# Patient Record
Sex: FEMALE | Race: WHITE
[De-identification: names, ages, dates, MRNs, and addresses within clinical notes are randomized per-mention and may not be internally consistent; named-entity substitution may affect disease eponyms.]

---

## 2017-03-06 ENCOUNTER — HOSPITAL ENCOUNTER (EMERGENCY)
Age: 82
Discharge: HOME | End: 2017-03-06
Payer: MEDICARE
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
## 2017-03-06 DIAGNOSIS — S01.81XA: Primary | ICD-10-CM

## 2017-03-06 DIAGNOSIS — R31.9: ICD-10-CM

## 2017-03-06 DIAGNOSIS — Z88.5: ICD-10-CM

## 2017-03-06 DIAGNOSIS — Z79.82: ICD-10-CM

## 2017-03-06 DIAGNOSIS — I48.91: ICD-10-CM
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
## 2017-03-06 DIAGNOSIS — Z79.899: ICD-10-CM

## 2017-03-06 LAB
BUN/CREATININE RATIO: 19 (ref 0–10)
HGB BLD-MCNC: 14.8 GM/DL (ref 12.3–15.3)
RED BLOOD COUNT: 4.82 M/UL (ref 4–5.1)
WHITE BLOOD COUNT: 11.7 K/UL (ref 4.5–11)

## 2017-06-19 ENCOUNTER — HOSPITAL ENCOUNTER (EMERGENCY)
Dept: HOSPITAL 79 - ER1 | Age: 82
Discharge: HOME | End: 2017-06-19
Payer: MEDICARE

## 2017-06-19 DIAGNOSIS — R07.9: Primary | ICD-10-CM

## 2017-06-19 DIAGNOSIS — I25.10: ICD-10-CM

## 2017-06-19 DIAGNOSIS — Z86.79: ICD-10-CM

## 2017-06-19 LAB
BUN/CREATININE RATIO: 14 (ref 0–10)
HGB BLD-MCNC: 13.7 GM/DL (ref 12.3–15.3)
RED BLOOD COUNT: 4.48 M/UL (ref 4–5.1)
WHITE BLOOD COUNT: 7.7 K/UL (ref 4.5–11)

## 2018-01-24 ENCOUNTER — APPOINTMENT (OUTPATIENT)
Dept: GENERAL RADIOLOGY | Facility: HOSPITAL | Age: 83
End: 2018-01-24

## 2018-01-24 ENCOUNTER — HOSPITAL ENCOUNTER (INPATIENT)
Facility: HOSPITAL | Age: 83
LOS: 2 days | Discharge: HOME HEALTH | End: 2018-01-26
Attending: FAMILY MEDICINE | Admitting: FAMILY MEDICINE

## 2018-01-24 ENCOUNTER — ANESTHESIA (OUTPATIENT)
Dept: PERIOP | Facility: HOSPITAL | Age: 83
End: 2018-01-24

## 2018-01-24 ENCOUNTER — ANESTHESIA EVENT (OUTPATIENT)
Dept: PERIOP | Facility: HOSPITAL | Age: 83
End: 2018-01-24

## 2018-01-24 DIAGNOSIS — Z74.09 IMPAIRED FUNCTIONAL MOBILITY, BALANCE, GAIT, AND ENDURANCE: ICD-10-CM

## 2018-01-24 DIAGNOSIS — I50.9 ACUTE ON CHRONIC CONGESTIVE HEART FAILURE, UNSPECIFIED CONGESTIVE HEART FAILURE TYPE: ICD-10-CM

## 2018-01-24 DIAGNOSIS — A41.9 SEPSIS SECONDARY TO UTI (HCC): ICD-10-CM

## 2018-01-24 DIAGNOSIS — J96.01 ACUTE RESPIRATORY FAILURE WITH HYPOXIA (HCC): ICD-10-CM

## 2018-01-24 DIAGNOSIS — N39.0 SEPSIS SECONDARY TO UTI (HCC): ICD-10-CM

## 2018-01-24 DIAGNOSIS — N20.1 URETERAL CALCULUS, RIGHT: Primary | ICD-10-CM

## 2018-01-24 PROBLEM — I48.20 CHRONIC ATRIAL FIBRILLATION (HCC): Chronic | Status: ACTIVE | Noted: 2018-01-24

## 2018-01-24 PROBLEM — I50.42 CHRONIC COMBINED SYSTOLIC AND DIASTOLIC HEART FAILURE: Chronic | Status: ACTIVE | Noted: 2018-01-24

## 2018-01-24 PROBLEM — N32.81 OVERACTIVE BLADDER: Chronic | Status: ACTIVE | Noted: 2018-01-24

## 2018-01-24 PROBLEM — E78.5 DYSLIPIDEMIA: Chronic | Status: ACTIVE | Noted: 2018-01-24

## 2018-01-24 PROBLEM — I25.10 ATHEROSCLEROSIS OF NATIVE CORONARY ARTERY OF NATIVE HEART WITHOUT ANGINA PECTORIS: Chronic | Status: ACTIVE | Noted: 2018-01-24

## 2018-01-24 PROBLEM — D68.318 COAGULATION DISORDER DUE TO CIRCULATING ANTICOAGULANTS (HCC): Chronic | Status: ACTIVE | Noted: 2018-01-24

## 2018-01-24 PROBLEM — I50.32 CHRONIC DIASTOLIC HEART FAILURE: Chronic | Status: ACTIVE | Noted: 2018-01-24

## 2018-01-24 PROBLEM — J90 PLEURAL EFFUSION ON RIGHT: Status: ACTIVE | Noted: 2018-01-24

## 2018-01-24 LAB
ALBUMIN SERPL-MCNC: 3.7 G/DL (ref 3.5–5)
ALBUMIN/GLOB SERPL: 1 G/DL (ref 1–2)
ALP SERPL-CCNC: 74 U/L (ref 38–126)
ALT SERPL W P-5'-P-CCNC: 28 U/L (ref 13–69)
ANION GAP SERPL CALCULATED.3IONS-SCNC: 14 MMOL/L
AST SERPL-CCNC: 19 U/L (ref 15–46)
BILIRUB SERPL-MCNC: 0.9 MG/DL (ref 0.2–1.3)
BUN BLD-MCNC: 17 MG/DL (ref 7–20)
BUN/CREAT SERPL: 14.2 (ref 7.1–23.5)
CALCIUM SPEC-SCNC: 10.2 MG/DL (ref 8.4–10.2)
CHLORIDE SERPL-SCNC: 104 MMOL/L (ref 98–107)
CO2 SERPL-SCNC: 29 MMOL/L (ref 26–30)
CREAT BLD-MCNC: 1.2 MG/DL (ref 0.6–1.3)
D-LACTATE SERPL-SCNC: 1.2 MMOL/L (ref 0.5–2)
GFR SERPL CREATININE-BSD FRML MDRD: 43 ML/MIN/1.73
GLOBULIN UR ELPH-MCNC: 3.6 GM/DL
GLUCOSE BLD-MCNC: 119 MG/DL (ref 74–98)
MAGNESIUM SERPL-MCNC: 2.1 MG/DL (ref 1.6–2.3)
POTASSIUM BLD-SCNC: 4 MMOL/L (ref 3.5–5.1)
PROT SERPL-MCNC: 7.3 G/DL (ref 6.3–8.2)
SODIUM BLD-SCNC: 143 MMOL/L (ref 137–145)

## 2018-01-24 PROCEDURE — 52356 CYSTO/URETERO W/LITHOTRIPSY: CPT | Performed by: UROLOGY

## 2018-01-24 PROCEDURE — 25010000002 ONDANSETRON PER 1 MG: Performed by: FAMILY MEDICINE

## 2018-01-24 PROCEDURE — 83605 ASSAY OF LACTIC ACID: CPT | Performed by: FAMILY MEDICINE

## 2018-01-24 PROCEDURE — 87040 BLOOD CULTURE FOR BACTERIA: CPT | Performed by: FAMILY MEDICINE

## 2018-01-24 PROCEDURE — 25010000002 MORPHINE PER 10 MG: Performed by: FAMILY MEDICINE

## 2018-01-24 PROCEDURE — 99223 1ST HOSP IP/OBS HIGH 75: CPT | Performed by: UROLOGY

## 2018-01-24 PROCEDURE — 0TC68ZZ EXTIRPATION OF MATTER FROM RIGHT URETER, VIA NATURAL OR ARTIFICIAL OPENING ENDOSCOPIC: ICD-10-PCS | Performed by: UROLOGY

## 2018-01-24 PROCEDURE — C1726 CATH, BAL DIL, NON-VASCULAR: HCPCS | Performed by: UROLOGY

## 2018-01-24 PROCEDURE — 25010000002 CEFTRIAXONE IN SWFI 1 GRAM/10ML IV PUSH SYRINGE (SIMPLE): Performed by: FAMILY MEDICINE

## 2018-01-24 PROCEDURE — 82360 CALCULUS ASSAY QUANT: CPT | Performed by: UROLOGY

## 2018-01-24 PROCEDURE — C1758 CATHETER, URETERAL: HCPCS | Performed by: UROLOGY

## 2018-01-24 PROCEDURE — 99223 1ST HOSP IP/OBS HIGH 75: CPT | Performed by: FAMILY MEDICINE

## 2018-01-24 PROCEDURE — C1769 GUIDE WIRE: HCPCS | Performed by: UROLOGY

## 2018-01-24 PROCEDURE — 82330 ASSAY OF CALCIUM: CPT | Performed by: FAMILY MEDICINE

## 2018-01-24 PROCEDURE — 80053 COMPREHEN METABOLIC PANEL: CPT | Performed by: FAMILY MEDICINE

## 2018-01-24 PROCEDURE — 74018 RADEX ABDOMEN 1 VIEW: CPT

## 2018-01-24 PROCEDURE — C2617 STENT, NON-COR, TEM W/O DEL: HCPCS | Performed by: UROLOGY

## 2018-01-24 PROCEDURE — 25010000002 PROPOFOL 200 MG/20ML EMULSION: Performed by: NURSE ANESTHETIST, CERTIFIED REGISTERED

## 2018-01-24 PROCEDURE — 87086 URINE CULTURE/COLONY COUNT: CPT | Performed by: FAMILY MEDICINE

## 2018-01-24 PROCEDURE — 83735 ASSAY OF MAGNESIUM: CPT | Performed by: FAMILY MEDICINE

## 2018-01-24 PROCEDURE — 71045 X-RAY EXAM CHEST 1 VIEW: CPT

## 2018-01-24 PROCEDURE — 25010000002 FENTANYL CITRATE (PF) 100 MCG/2ML SOLUTION: Performed by: NURSE ANESTHETIST, CERTIFIED REGISTERED

## 2018-01-24 PROCEDURE — 0T768DZ DILATION OF RIGHT URETER WITH INTRALUMINAL DEVICE, VIA NATURAL OR ARTIFICIAL OPENING ENDOSCOPIC: ICD-10-PCS | Performed by: UROLOGY

## 2018-01-24 DEVICE — URETERAL STENT
Type: IMPLANTABLE DEVICE | Site: URETER | Status: FUNCTIONAL
Brand: CONTOUR™

## 2018-01-24 RX ORDER — RANOLAZINE 500 MG/1
500 TABLET, EXTENDED RELEASE ORAL 2 TIMES DAILY
COMMUNITY
End: 2020-08-17 | Stop reason: HOSPADM

## 2018-01-24 RX ORDER — SODIUM CHLORIDE 9 MG/ML
INJECTION, SOLUTION INTRAVENOUS CONTINUOUS PRN
Status: DISCONTINUED | OUTPATIENT
Start: 2018-01-24 | End: 2018-01-24 | Stop reason: SURG

## 2018-01-24 RX ORDER — MORPHINE SULFATE 2 MG/ML
1 INJECTION, SOLUTION INTRAMUSCULAR; INTRAVENOUS
Status: DISCONTINUED | OUTPATIENT
Start: 2018-01-24 | End: 2018-01-26 | Stop reason: HOSPADM

## 2018-01-24 RX ORDER — METOPROLOL TARTRATE 5 MG/5ML
2.5 INJECTION INTRAVENOUS EVERY 6 HOURS PRN
Status: DISCONTINUED | OUTPATIENT
Start: 2018-01-24 | End: 2018-01-26 | Stop reason: HOSPADM

## 2018-01-24 RX ORDER — NALOXONE HCL 0.4 MG/ML
0.4 VIAL (ML) INJECTION
Status: DISCONTINUED | OUTPATIENT
Start: 2018-01-24 | End: 2018-01-26 | Stop reason: HOSPADM

## 2018-01-24 RX ORDER — METOPROLOL TARTRATE 50 MG/1
100 TABLET, FILM COATED ORAL 2 TIMES DAILY
COMMUNITY

## 2018-01-24 RX ORDER — ASPIRIN 81 MG/1
81 TABLET ORAL DAILY
Status: DISCONTINUED | OUTPATIENT
Start: 2018-01-24 | End: 2018-01-26 | Stop reason: HOSPADM

## 2018-01-24 RX ORDER — ONDANSETRON 2 MG/ML
4 INJECTION INTRAMUSCULAR; INTRAVENOUS EVERY 6 HOURS PRN
Status: DISCONTINUED | OUTPATIENT
Start: 2018-01-24 | End: 2018-01-26 | Stop reason: HOSPADM

## 2018-01-24 RX ORDER — PRAVASTATIN SODIUM 20 MG
20 TABLET ORAL NIGHTLY
COMMUNITY

## 2018-01-24 RX ORDER — FUROSEMIDE 40 MG/1
40 TABLET ORAL EVERY OTHER DAY
Status: ON HOLD | COMMUNITY
End: 2020-08-13

## 2018-01-24 RX ORDER — PROPOFOL 10 MG/ML
INJECTION, EMULSION INTRAVENOUS AS NEEDED
Status: DISCONTINUED | OUTPATIENT
Start: 2018-01-24 | End: 2018-01-24 | Stop reason: SURG

## 2018-01-24 RX ORDER — RANOLAZINE 500 MG/1
500 TABLET, EXTENDED RELEASE ORAL EVERY 12 HOURS SCHEDULED
Status: DISCONTINUED | OUTPATIENT
Start: 2018-01-24 | End: 2018-01-26 | Stop reason: HOSPADM

## 2018-01-24 RX ORDER — SODIUM CHLORIDE 9 MG/ML
50 INJECTION, SOLUTION INTRAVENOUS CONTINUOUS
Status: DISCONTINUED | OUTPATIENT
Start: 2018-01-24 | End: 2018-01-25

## 2018-01-24 RX ORDER — DABIGATRAN ETEXILATE 75 MG/1
75 CAPSULE ORAL EVERY 12 HOURS SCHEDULED
Status: DISCONTINUED | OUTPATIENT
Start: 2018-01-24 | End: 2018-01-24 | Stop reason: ALTCHOICE

## 2018-01-24 RX ORDER — ISOSORBIDE MONONITRATE 30 MG/1
30 TABLET, EXTENDED RELEASE ORAL
Status: DISCONTINUED | OUTPATIENT
Start: 2018-01-24 | End: 2018-01-26 | Stop reason: HOSPADM

## 2018-01-24 RX ORDER — DOCUSATE SODIUM 100 MG/1
100 CAPSULE, LIQUID FILLED ORAL 2 TIMES DAILY PRN
Status: ON HOLD | COMMUNITY
End: 2020-08-13

## 2018-01-24 RX ORDER — DOCUSATE SODIUM 100 MG/1
100 CAPSULE, LIQUID FILLED ORAL 2 TIMES DAILY PRN
Status: DISCONTINUED | OUTPATIENT
Start: 2018-01-24 | End: 2018-01-26 | Stop reason: HOSPADM

## 2018-01-24 RX ORDER — ATORVASTATIN CALCIUM 10 MG/1
10 TABLET, FILM COATED ORAL DAILY
Status: DISCONTINUED | OUTPATIENT
Start: 2018-01-24 | End: 2018-01-26 | Stop reason: HOSPADM

## 2018-01-24 RX ORDER — MAGNESIUM HYDROXIDE 1200 MG/15ML
LIQUID ORAL AS NEEDED
Status: DISCONTINUED | OUTPATIENT
Start: 2018-01-24 | End: 2018-01-24 | Stop reason: HOSPADM

## 2018-01-24 RX ORDER — FUROSEMIDE 20 MG/1
40 TABLET ORAL EVERY MORNING
COMMUNITY
End: 2020-08-17 | Stop reason: HOSPADM

## 2018-01-24 RX ORDER — LEVOFLOXACIN 5 MG/ML
500 INJECTION, SOLUTION INTRAVENOUS
Status: DISCONTINUED | OUTPATIENT
Start: 2018-01-24 | End: 2018-01-24 | Stop reason: HOSPADM

## 2018-01-24 RX ORDER — NALOXONE HCL 0.4 MG/ML
0.4 VIAL (ML) INJECTION
Status: DISCONTINUED | OUTPATIENT
Start: 2018-01-24 | End: 2018-01-24

## 2018-01-24 RX ORDER — MULTIPLE VITAMINS W/ MINERALS TAB 9MG-400MCG
1 TAB ORAL DAILY
Status: DISCONTINUED | OUTPATIENT
Start: 2018-01-24 | End: 2018-01-26 | Stop reason: HOSPADM

## 2018-01-24 RX ORDER — SODIUM CHLORIDE 0.9 % (FLUSH) 0.9 %
1-10 SYRINGE (ML) INJECTION AS NEEDED
Status: DISCONTINUED | OUTPATIENT
Start: 2018-01-24 | End: 2018-01-26 | Stop reason: HOSPADM

## 2018-01-24 RX ORDER — TOLTERODINE 4 MG/1
4 CAPSULE, EXTENDED RELEASE ORAL DAILY
COMMUNITY

## 2018-01-24 RX ORDER — FENTANYL CITRATE 50 UG/ML
INJECTION, SOLUTION INTRAMUSCULAR; INTRAVENOUS AS NEEDED
Status: DISCONTINUED | OUTPATIENT
Start: 2018-01-24 | End: 2018-01-24 | Stop reason: SURG

## 2018-01-24 RX ORDER — MORPHINE SULFATE 4 MG/ML
1 INJECTION, SOLUTION INTRAMUSCULAR; INTRAVENOUS
Status: DISCONTINUED | OUTPATIENT
Start: 2018-01-24 | End: 2018-01-24

## 2018-01-24 RX ORDER — DABIGATRAN ETEXILATE 75 MG/1
75 CAPSULE ORAL EVERY 12 HOURS SCHEDULED
Status: DISCONTINUED | OUTPATIENT
Start: 2018-01-25 | End: 2018-01-26 | Stop reason: HOSPADM

## 2018-01-24 RX ORDER — DABIGATRAN ETEXILATE 75 MG/1
75 CAPSULE ORAL 2 TIMES DAILY
COMMUNITY

## 2018-01-24 RX ORDER — POTASSIUM CHLORIDE 750 MG/1
10 TABLET, FILM COATED, EXTENDED RELEASE ORAL DAILY
Status: ON HOLD | COMMUNITY
End: 2020-08-13

## 2018-01-24 RX ORDER — ASPIRIN 81 MG/1
81 TABLET ORAL DAILY
COMMUNITY

## 2018-01-24 RX ORDER — ISOSORBIDE MONONITRATE 30 MG/1
30 TABLET, EXTENDED RELEASE ORAL 2 TIMES DAILY
COMMUNITY

## 2018-01-24 RX ADMIN — Medication 100 MCG: at 14:11

## 2018-01-24 RX ADMIN — LIDOCAINE HYDROCHLORIDE 80 MG: 20 INJECTION, SOLUTION INTRAVENOUS at 13:59

## 2018-01-24 RX ADMIN — MORPHINE SULFATE 1 MG: 2 INJECTION, SOLUTION INTRAMUSCULAR; INTRAVENOUS at 12:45

## 2018-01-24 RX ADMIN — FENTANYL CITRATE 25 MCG: 50 INJECTION, SOLUTION INTRAMUSCULAR; INTRAVENOUS at 13:59

## 2018-01-24 RX ADMIN — RANOLAZINE 500 MG: 500 TABLET, FILM COATED, EXTENDED RELEASE ORAL at 08:35

## 2018-01-24 RX ADMIN — Medication 100 MCG: at 14:18

## 2018-01-24 RX ADMIN — Medication 100 MCG: at 14:25

## 2018-01-24 RX ADMIN — MULTIPLE VITAMINS W/ MINERALS TAB 1 TABLET: TAB at 08:35

## 2018-01-24 RX ADMIN — CEFTRIAXONE SODIUM 1 G: 1 INJECTION, POWDER, FOR SOLUTION INTRAMUSCULAR; INTRAVENOUS at 18:44

## 2018-01-24 RX ADMIN — Medication 100 MCG: at 14:31

## 2018-01-24 RX ADMIN — SODIUM CHLORIDE: 9 INJECTION, SOLUTION INTRAVENOUS at 13:57

## 2018-01-24 RX ADMIN — PROPOFOL 100 MG: 10 INJECTION, EMULSION INTRAVENOUS at 13:59

## 2018-01-24 RX ADMIN — EPHEDRINE SULFATE 10 MG: 50 INJECTION INTRAMUSCULAR; INTRAVENOUS; SUBCUTANEOUS at 14:05

## 2018-01-24 RX ADMIN — Medication 100 MCG: at 14:05

## 2018-01-24 RX ADMIN — METOPROLOL TARTRATE 75 MG: 50 TABLET ORAL at 20:51

## 2018-01-24 RX ADMIN — METOPROLOL TARTRATE 75 MG: 50 TABLET ORAL at 08:35

## 2018-01-24 RX ADMIN — RANOLAZINE 500 MG: 500 TABLET, FILM COATED, EXTENDED RELEASE ORAL at 20:52

## 2018-01-24 RX ADMIN — ATORVASTATIN CALCIUM 10 MG: 10 TABLET, FILM COATED ORAL at 08:35

## 2018-01-24 RX ADMIN — ISOSORBIDE MONONITRATE 30 MG: 30 TABLET, EXTENDED RELEASE ORAL at 08:35

## 2018-01-24 RX ADMIN — SODIUM CHLORIDE 100 ML/HR: 9 INJECTION, SOLUTION INTRAVENOUS at 15:35

## 2018-01-24 RX ADMIN — ONDANSETRON 4 MG: 2 INJECTION INTRAMUSCULAR; INTRAVENOUS at 12:46

## 2018-01-24 RX ADMIN — SODIUM CHLORIDE 100 ML/HR: 9 INJECTION, SOLUTION INTRAVENOUS at 02:18

## 2018-01-24 NOTE — OP NOTE
URETEROSCOPY LASER LITHOTRIPSY WITH STENT INSERTION  Procedure Note    Matthew Fishman  1/24/2018    Pre-op Diagnosis:   Ureteral calculus, right [N20.1]  Sepsis secondary to UTI [A41.9, N39.0]    Post-op Diagnosis:     Post-Op Diagnosis Codes:     * Ureteral calculus, right [N20.1]     * Sepsis secondary to UTI [A41.9, N39.0]    Procedure/CPT® Codes:      Procedure(s):  URETEROSCOPY LASER LITHOTRIPSY WITH STENT INSERTION    Surgeon(s):  Camron Taveras MD    Anesthesia: General  Operative technique: This patient has a 7 mm calculus in her right pelvic ureter.  She was given general anesthesia placed in the dorsal lithotomy position prepped and draped in routine sterile fashion.  The cystoscope was inserted into the bladder but no urine was obtained since she had a catheter in.  The right ureteral orifice was immediately seen in the universal wedge was used to insert contrast and saline to outline the right collecting system.  High-grade obstruction was seen from a 7 mm stone with proximal dilatation.  The distal ureter was dilated to 4 mm with the balloon and then rigid ureteroscopy was performed alongside the guidewire that was inserted up into the right renal pelvis.  The calculus was immediately apparent and engaged with basket but was too large to extract.  The 365 µ laser fiber was connected to the 20 W holmium laser and used to fragment the stone into pieces.  These were individually extracted and submitted for chemical analysis.  A 6 [place] 22 cm double-J stent was then inserted up into the right kidney.  The bladder was drained with a Jimenez was not reinserted.  The patient tolerated the procedure well and was returned to the ICU in stable condition.  Staff:   Circulator: Sydni Arellano RN; Dione Carlson RN  Scrub Person: Kiara Guerra    Estimated Blood Loss: none    Specimens:                  ID Type Source Tests Collected by Time Destination   1 : KIDNEY STONE RIGHT Calculus Kidney, Right STONE  ANALYSIS Dione Carlson RN 1/24/2018 1430          Drains:           Findings: 7 mm calculus impacted in the right distal ureter    Complications: None      Camron Taveras MD     Date: 1/24/2018  Time: 3:07 PM

## 2018-01-24 NOTE — PROGRESS NOTES
Adult Nutrition  Assessment/PES    Patient Name:  Matthew Fishman  YOB: 1935  MRN: [id number]  Admit Date:  1/24/2018    Assessment Date:  1/24/2018    Comments:  Rec. #1: Advance diet once medically feasible to cardiac. RD to add nutritional supplements once diet advances. Rec. #2: Continue with MVI. RD to follow pt. Consult RD PRN.           Reason for Assessment       01/24/18 1144    Reason for Assessment    Reason For Assessment/Visit admission assessment    Identified At Risk By Screening Criteria BMI;diagnosis    Cardiac DHF;HTN;CABG;MI;Dyslipidemia    Gastrointestinal Nausea    Infectious Disease Sepsis    Pulmonary/Critical Care Acute respiratory failure;Other (comment)   hypoxia; pleural effusion    Renal Other (comment)   ureterolitiasis                  Labs/Tests/Procedures/Meds       01/24/18 1146    Labs/Tests/Procedures/Meds    Labs/Tests Review Reviewed;Glucose   High    Medication Review Reviewed, pertinent;Antibiotic;Multivitamin            Physical Findings       01/24/18 1147    Physical Findings/Assessment    Additional Documentation Physical Appearance (Group)    Physical Appearance    Overall Physical Appearance obese            Estimated/Assessed Needs       01/24/18 1147    Calculation Measurements    Weight Used For Calculations 53.2 kg (117 lb 4.6 oz)    Height Used for Calculations 1.524 m (5')    Estimated/Assessed Energy Needs    Energy Need Method Pender-St Jeor    Age 82    RMR (Pender-St. Jeor Equation) 918.5    Activity Factors (Pender St Jeor)  Out of bed, ambulatory  1.3    Estimated Kcal Range  ~2887-6546    Estimated/Assessed Protein Needs    Weight Used for Protein Calculation 53.2 kg (117 lb 4.6 oz)    Protein (gm/kg) 1.5    1.5 Gm Protein (gm) 79.8    Estimated Protein Range ~63.84-79.8    Estimated/Assessed Fluid Needs    Fluid Need Method RDA method    RDA Method (mL)  1500            Nutrition Prescription Ordered       01/24/18 1148    Nutrition  Prescription PO    Current PO Diet NPO   x 1 day            Evaluation of Received Nutrient/Fluid Intake       01/24/18 1148    PO Evaluation    Number of Days PO Intake Evaluated Insufficient Data   NPO            Problem/Interventions:        Problem 1       01/24/18 1148    Nutrition Diagnoses Problem 1    Problem 1 Overweight/Obesity    Etiology (related to) Factors Affecting Nutrition    Food Habit/Preferences Large Meals    Signs/Symptoms (evidenced by) BMI    BMI 30 - 34.9            Problem 2       01/24/18 1149    Nutrition Diagnoses Problem 2    Problem 2 Increased Nutrient Needs    Macronutrient Kcal;Protein    Etiology (related to) Medical Diagnosis    Infectious Disease Sepsis    Signs/Symptoms (evidenced by) Other (comment)   sepsis dx.                   Intervention Goal       01/24/18 1150    Intervention Goal    General Meet nutritional needs for age/condition    PO Advance diet            Nutrition Intervention       01/24/18 1151    Nutrition Intervention    RD/Tech Action Await begin PO;Follow Tx progress            Nutrition Prescription       01/24/18 1151    Nutrition Prescription PO    PO Prescription Begin/change diet;Begin/change supplement    Begin/Change Diet to Clear Liquid    Supplement Ensure Clear    Supplement Frequency 3 times a day    New PO Prescription Ordered? No, recommended            Education/Evaluation       01/24/18 1151    Education    Education Will Instruct as appropriate    Monitor/Evaluation    Monitor Per protocol;PO intake;Supplement intake;Pertinent labs;Weight;I&O        Electronically signed by:  Karina Estrada RD  01/24/18 11:51 AM

## 2018-01-24 NOTE — ANESTHESIA POSTPROCEDURE EVALUATION
Patient: Matthew Fishman    Procedure Summary     Date Anesthesia Start Anesthesia Stop Room / Location    01/24/18 1357  [facility]       Procedure Diagnosis Surgeon Provider    URETEROSCOPY LASER LITHOTRIPSY WITH STENT INSERTION (N/A ) Ureteral calculus, right; Sepsis secondary to UTI  (Ureteral calculus, right [N20.1]; Sepsis secondary to UTI [A41.9, N39.0]) MD Galen Parish CRNA          Anesthesia Type: general  Last vitals  BP   134/65   Temp   97   Pulse   101   Resp   18   SpO2   99     Post Anesthesia Care and Evaluation    Patient location during evaluation: ICU  Patient participation: complete - patient participated  Level of consciousness: awake and alert  Pain score: 0  Pain management: satisfactory to patient  Airway patency: patent  Anesthetic complications: No anesthetic complications  PONV Status: none  Cardiovascular status: acceptable and stable  Respiratory status: face mask and acceptable  Hydration status: acceptable

## 2018-01-24 NOTE — PLAN OF CARE
Problem: Patient Care Overview (Adult)  Goal: Plan of Care Review  Outcome: Ongoing (interventions implemented as appropriate)   01/24/18 0353   Coping/Psychosocial Response Interventions   Plan Of Care Reviewed With patient   Patient Care Overview   Progress no change       Problem: Infection, Risk/Actual (Adult)  Goal: Identify Related Risk Factors and Signs and Symptoms  Outcome: Ongoing (interventions implemented as appropriate)    Goal: Infection Prevention/Resolution  Outcome: Ongoing (interventions implemented as appropriate)

## 2018-01-24 NOTE — H&P
"    [facility] Medicine Services  HISTORY AND PHYSICAL    Primary Care Physician: Mandy Rhodes MD    Subjective     Chief Complaint:  Sepsis with UTI/A fib with RVR/R ureterolithiasis      History of Present Illness:     I have reviewed labs/imaging/records from this hospitalization, including ER staff and admitting/attending physicians H/P's and progress notes to establish a comprehensive understanding of this patient's clinical hospital course, as well as to establish a transition of care appropriately.    Patient is an 82-year-old female who presented to [facility] ER with complaints of 1-2 days of generalized abdominal and right lower back discomfort.  She developed some significant nausea and pain described as \"giving birth\".  Patient gave history of nonspecific subjective fever with chills.  In the emergency room at that facility showed underwent a CT scan of the abdomen and pelvis which demonstrated a 7 mm obstructing ureterolithiasis, with moderate hydroureteronephrosis.  Patient does take Lasix daily as treatment for her combined systolic/diastolic heart failure.  She has known extensive coronary atherosclerotic disease, including history of CABG and stenting post CABG.  Patient does have an AICD.  She is normally followed by her cardiologist in [facility], although she denies any active chest pain or uncontrolled respiratory symptoms.  She was noted to have hypoxia by EMS as well as in the emergency room, subsequently improved with 2 L nasal cannula support.  Dr. Didier Taveras was contacted by [facility] ER staff, and he agreed to see patient if she was admitted to this facility.  We had an ICU bed available, and patient was subsequently transferred here she was clinically and sepsis, complicated by atrial fibrillation with RVR shortly after arrival to that facility.  She does have chronic atrial fibrillation, and his anticoagulated with Pradaxa.  Her lactic acid level " was normal, however her heart rate was elevated.  She was started on IV Rocephin due to a recent urinary tract infection demonstrating Escherichia coli that was sensitive to Rocephin approximately 12 months ago.  Her creatinine was 1.1 and BUN was reported at 14.  Her white blood cell count was elevated to 18,000.  Arterial blood gas showed a pH of 7.43/PCO2 of 49/CO2 of 81.    Patient is accompanied to the intensive care unit by her daughter who is at bedside.  [facility]    Review of Systems   1. Constitutional: Subjective fever with chills.  No diaphoresis, but progressive worsening of malaise/fatigue.  2. HENT: No dysphagia; no changes to vision/hearing/smell/taste; no epistaxis  3. Eyes: Negative for redness and visual disturbance.   4. Respiratory: negative for shortness of breath. Negative for chest pain . Negative for cough and chest tightness.   5. Cardiovascular: Negative for chest pain and palpitations.   6. Gastrointestinal: Negative for abdominal distention, abdominal pain and blood in stool.  Decreased appetite with resultant poor by mouth intake.  7. Endocrine: Negative for cold intolerance and heat intolerance.   8. Genitourinary: Decreased frequency and volume of urination, no defined hematuria.   9. Musculoskeletal: Chronic arthralgias, with new onset right lower back pain and myalgia.   10. Skin: Negative for color change, rash and wound.   11. Neurological: Negative for syncope, weakness and headaches.   12. Hematological: Negative for adenopathy. Does not bruise/bleed easily.   13. Psychiatric/Behavioral: Negative for confusion. The patient is not nervous/anxious.     Past Medical History:   Past Medical History:   Diagnosis Date   • Arthritis    • Atrial fibrillation, chronic    • High cholesterol    • History of MI (myocardial infarction)    • HTN (hypertension)    • Hx of CABG 1989    triple   • Hx of cholecystectomy    • Pacemaker    •  Previous back surgery        Past Surgical History:  Past Surgical History:   Procedure Laterality Date   • BACK SURGERY     • CHOLECYSTECTOMY     • CORONARY ARTERY BYPASS GRAFT      x3       Family History: family history is not on file.    Social History:  reports that she has never smoked. She has never used smokeless tobacco. She reports that she does not drink alcohol or use illicit drugs.    Medications:  Prescriptions Prior to Admission   Medication Sig Dispense Refill Last Dose   • aspirin 81 MG EC tablet Take 81 mg by mouth Daily.   1/23/2018 at Unknown time   • dabigatran etexilate (PRADAXA) 75 MG capsule Take 75 mg by mouth 2 (Two) Times a Day.   1/23/2018 at Unknown time   • docusate sodium (COLACE) 100 MG capsule Take 100 mg by mouth 2 (Two) Times a Day As Needed for Constipation.      • furosemide (LASIX) 20 MG tablet Take 20 mg by mouth Every Other Day.   1/23/2018 at Unknown time   • furosemide (LASIX) 40 MG tablet Take 40 mg by mouth Every Other Day.   1/23/2018 at Unknown time   • isosorbide mononitrate (IMDUR) 30 MG 24 hr tablet Take 30 mg by mouth 2 (Two) Times a Day.   1/23/2018 at Unknown time   • metoprolol tartrate (LOPRESSOR) 50 MG tablet Take 75 mg by mouth 2 (Two) Times a Day.   1/23/2018 at Unknown time   • Multiple Vitamins-Minerals (CENTRUM SILVER 50+WOMEN PO) Take 1 tablet by mouth Daily.   1/23/2018 at Unknown time   • potassium chloride (K-DUR) 10 MEQ CR tablet Take 10 mEq by mouth Daily.   1/23/2018 at Unknown time   • pravastatin (PRAVACHOL) 20 MG tablet Take 20 mg by mouth Daily.   1/23/2018 at Unknown time   • ranolazine (RANEXA) 500 MG 12 hr tablet Take 500 mg by mouth 2 (Two) Times a Day.   1/23/2018 at Unknown time   • tolterodine LA (DETROL LA) 4 MG 24 hr capsule Take 4 mg by mouth Daily.   1/23/2018 at Unknown time       Allergies:  Allergies   Allergen Reactions   • Codeine          Objective     Physical Exam:  Vital Signs: There were no vitals taken for this visit.      General Appearance: alert, oriented x 3, no acute distress.Mildly ill-appearing female, pleasant during examination and questioning.  Skin: warm and dry.  Mildly decreased skin turgor.  Age-related atrophy of the skin.  HEENT: Atraumatic.  pupils round and reactive to light and accommodation, oral mucosa pink and moist.  Nares patent without epistaxis.  External auditory canals are patent tympanic membranes intact.  Neck: supple, no JVD, trachea midline.  No thyromegaly  Lungs: Rhonchus breath sounds to right lower lung base, fine sandpaper-like crackles to right base as well.  Audible air exchange noted all other lung fields  Heart: Heart rate ranges between  bpm during my exam, normal S1 and S2, no S3, no rub.  AICD noted subclavicular  Abdomen: soft, non-tender, no palpable bladder, present bowel sounds to auscultation ×4.  No guarding or rigidity.  No defined CVA tenderness.  Extremities: no clubbing, cyanosis or edema.  Good range of motion actively and passively.  Symmetric muscle strength and development.  Thin, frail build.  Neuro: normal speech and mental status.  Cranial nerves II through XII intact.  No anosmia. DTR 2+; proprioception intact.  No focal motor/sensory deficits.          Results Reviewed:    Lab Results (last 72 hours)     ** No results found for the last 72 hours. **          Imaging Results (last 72 hours)     ** No results found for the last 72 hours. **          ECG/EMG Results (most recent)     None          I have personally reviewed and interpreted available lab data, radiology studies and ECG obtained at time of admission.     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  Patient's heart rate appears relatively well controlled at present, we'll begin her normal scheduled dosing of metoprolol and continue monitoring.  I reviewed the images provided on CT scan of the abdomen and pelvis on disc from [facility] ER.  Noted right distal ureteral stone.  There is a significant right lower lobe pleural effusion versus early consolidation.  I will repeat chest x-ray now.  I have ordered a repeat CMP/CBC/magnesium now, as well as repeated blood culture with ARDS.  I will repeat a urine culture.  Jimenez catheter placed secondary to immobility.  Dr. Didier Taveras has been consult it, hopeful to see patient later this morning, we'll keep patient nothing by mouth until such time.  Patient will need ureteroscopy with probable stent placement and possibly lithotripsy.  Continue IV Rocephin at this time.  Follow cultures and change antibiotic coverage as needed.  IV fluids with normal saline rehydration, avoid bolus therapy given her underlying combined systolic/diastolic heart failure.  Home medications have been reconciled.  We'll hold use of Lasix at this time due to her ureterolithiasis.  Anti-emetic and analgesic ordered as needed.  Continue oxygen supplementation via nasal cannula as needed.  Encouraged use of incentive spirometry 6 times per hour while awake.  I've discussed the plan of care in detail with patient and her daughter at bedside, both verbalize understanding and agree.  I've answered all the questions today.    I spent a total of 70 minutes in direct critical care time today.      Ajit Weller,  01/24/18 2:00 AM    Please note that portions of this note may have been completed with a voice recognition program. Efforts were made to edit the dictations, but occasionally  words are mistranscribed.

## 2018-01-24 NOTE — PROGRESS NOTES
[facility]    Name:  Matthew Fishman   Age:  82 y.o.  Sex:  female  :  1935  MRN:  [id number]   Visit Number:  [id number]  Admission Date:  2018  Date Of Service:  18  Primary Care Physician:  Mandy Rhodes MD     LOS: 0 days :      Chief Complaint:  Abdominal pain. Follow up UTI sepsis and ureterolithiasis        Subjective / Interval History: The patient is a 82 yr old lady with afib on anticoagulation, admitted with afib with rvr, UTI, sepsis, and ureterolithiasis. She is sitting up in ICU bed in no acute distress. She has mild pain without grimace. Jimenez in place. She denies chest pain, shortness of breath, nausea, vomiting. She is NPO for procedure today.       Review of Systems:     General ROS: Patient denies any fevers, chills or loss of consciousness.  Ophthalmic ROS: Denies any diplopia or transient loss of vision.  ENT ROS: Denies sore throat, nasal congestion or ear pain.   Respiratory ROS: Small cough or shortness of breath.  Cardiovascular ROS: Denies chest pain or palpitations. No history of exertional chest pain.   Gastrointestinal ROS: Denies nausea and vomiting. Mild right sided aching and burning abdominal pain. No diarrhea.  Genito-Urinary ROS: Denies dysuria or hematuria.  Musculoskeletal ROS: Denies back pain. No muscle pain. No calf pain.  Neurological ROS: Denies any focal weakness. No loss of consciousness. Denies any numbness. Denies neck pain.   Dermatological ROS: Denies any redness or pruritis.    Vital Signs:    Temp:  [97.7 °F (36.5 °C)-98.3 °F (36.8 °C)] 98.3 °F (36.8 °C)  Heart Rate:  [] 101  Resp:  [20-27] 20  BP: (117-148)/(70-96) 121/77    Intake and output:    I/O last 3 completed shifts:  In: -   Out: 150 [Urine:150]  I/O this shift:  In: 500 [I.V.:500]  Out: -     Physical Examination:    General Appearance:    Alert and cooperative, not in any acute distress.   Head:    Atraumatic and  normocephalic, without obvious abnormality.   Eyes:            PERRLA, conjunctivae and sclerae normal, no Icterus. No pallor. Extra-occular movements are within normal limits.   Throat:   No oral lesions, no thrush, oral mucosa moist.   Neck:   Supple, trachea midline, no thyromegaly   Lungs:     Chest shape is normal. Breath sounds heard bilaterally equally.  No crackles or wheezing. No Pleural rub or bronchial breathing.    Heart:    Normal S1 and S2, no murmur, no gallop, no rub. No JVD   Abdomen:     Normal bowel sounds, no masses, no organomegaly. Soft        non-tender, non-distended, no guarding, no rebound                tenderness   Extremities:   Moves all extremities well, no edema, no cyanosis, no             clubbing   Skin:   No bleeding, bruising or rash.   Neurologic:   Cranial nerves 2 - 12 grossly intact, sensation intact, Motor power is normal and equal bilaterally.   Laboratory results:    Lab Results (last 24 hours)     Procedure Component Value Units Date/Time    Calcium, Ionized [[id number]] Collected:  01/24/18 0228    Specimen:  Blood Updated:  01/24/18 0308    Urine Culture - Urine, Urine, Clean Catch [[id number]] Collected:  01/24/18 0217    Specimen:  Urine from Urine, Clean Catch Updated:  01/24/18 0308    Lactic Acid, Plasma [[id number]]  (Normal) Collected:  01/24/18 0228    Specimen:  Blood Updated:  01/24/18 0324     Lactate 1.2 mmol/L     Magnesium [[id number]]  (Normal) Collected:  01/24/18 0228    Specimen:  Blood Updated:  01/24/18 0326     Magnesium 2.1 mg/dL     Comprehensive Metabolic Panel [[id number]]  (Abnormal) Collected:  01/24/18 0228    Specimen:  Blood Updated:  01/24/18 0326     Glucose 119 (H) mg/dL      BUN 17 mg/dL      Creatinine 1.20 mg/dL      Sodium 143 mmol/L      Potassium 4.0 mmol/L      Chloride 104 mmol/L      CO2 29.0 mmol/L      Calcium 10.2 mg/dL      Total Protein 7.3 g/dL      Albumin 3.70 g/dL      ALT (SGPT) 28 U/L      AST (SGOT) 19 U/L      Alkaline  Phosphatase 74 U/L      Total Bilirubin 0.9 mg/dL      eGFR Non African Amer 43 (L) mL/min/1.73      Globulin 3.6 gm/dL      A/G Ratio 1.0 g/dL      BUN/Creatinine Ratio 14.2     Anion Gap 14.0 mmol/L     Narrative:       The MDRD GFR formula is only valid for adults with stable renal function between ages 18 and 70.    Stone Analysis - Calculus, Kidney, Right [[id number]] Collected:  01/24/18 1430    Specimen:  Calculus from Kidney, Right Updated:  01/24/18 1458    Blood Culture With CARMEN - Blood, [[id number]]  (Normal) Collected:  01/24/18 0228    Specimen:  Blood from Arm, Right Updated:  01/24/18 1516     Blood Culture No growth at less than 24 hours          I have reviewed the patient's laboratory results.    Radiology results:    Imaging Results (last 24 hours)     Procedure Component Value Units Date/Time    XR Chest 1 View [[id number]] Collected:  01/24/18 0757     Updated:  01/24/18 0800    Narrative:       PROCEDURE: XR CHEST 1 VW-     HISTORY: hypoxia/RLL pneumonia     COMPARISON: None.     FINDINGS: A left subclavian pacemaker is in place. The heart is normal  in size. The mediastinum is unremarkable. The patient is status post  median sternotomy and CABG procedure..  There are increased interstitial  markings which are felt to be chronic. There our low lung volumes with a  right basilar opacity which may reflect atelectasis or pneumonia. There  is no pneumothorax. There are no acute osseous abnormalities.           Impression:       Low lung volumes with a right basilar opacity which may  reflect atelectasis or pneumonia.        This report was finalized on 1/24/2018 7:58 AM by Court Mcclelland M.D..    XR Abdomen KUB [[id number]] Collected:  01/24/18 0756     Updated:  01/24/18 0824    Narrative:       PROCEDURE: XR ABDOMEN KUB-     HISTORY: pre-procedure; N20.1-Calculus of ureter; A41.9-Sepsis,  unspecified organism; N39.0-Urinary tract infection, site not specified     COMPARISON: None.     FINDINGS:  An AP view of the abdomen and pelvis demonstrates an  unremarkable bowel gas pattern with no evidence of obstruction. There is  a 5 mm calculus in the right hemipelvis which is nonspecific.           Impression:       Nonspecific 5 mm calcification in the right hemipelvis which  in the correct clinical setting could reflect a distal ureteral stone.             This report was finalized on 1/24/2018 7:57 AM by Court Mcclelland M.D..          I have reviewed the patient's radiology reports.    Medication Review:     I have reviewed the patients active and prn medications.     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 Her heart rate is stable at 100. Her blood pressure has remained well controlled. Anticipate further improvement and likely transfer to the floor later today. Pain controlled. Continue oxygen if needed and nebs treatments as needed. Monitor closely for pneumonia. Ceftriaxone continued. Daily labs and medications will be adjusted accordingly.      Medication risks and benefits were discussed in detail. Patient reported satisfaction with care delivered and treatment plan.     Alicia Shannon DO  01/24/18  3:30 PM

## 2018-01-24 NOTE — ANESTHESIA PREPROCEDURE EVALUATION
Anesthesia Evaluation     Patient summary reviewed and Nursing notes reviewed   no history of anesthetic complications:  NPO Solid Status: > 8 hours  NPO Liquid Status: > 8 hours     Airway   Mallampati: I  TM distance: >3 FB  Neck ROM: full  no difficulty expected  Dental - normal exam   (+) lower dentures and upper dentures    Pulmonary - normal exam   (+) pneumonia stable ,     ROS comment: 2LNC  Cardiovascular - normal exam    PT is on anticoagulation therapy  Patient on routine beta blocker and Beta blocker given within 24 hours of surgery  Rhythm: regular  Rate: normal    (+) pacemaker ICD, pacemaker interrogated unknown, hypertension, CAD, CABG 3-6 Months, cardiac stents Drug eluting stent more than 12 months ago dysrhythmias (Chronic AFib) Atrial Fib, CHF, hyperlipidemia      Neuro/Psych- negative ROS  GI/Hepatic/Renal/Endo    (+) obesity,  renal disease stones and ARF,     Musculoskeletal     (+) back pain,   Abdominal  - normal exam    Abdomen: soft.  Bowel sounds: normal.   Substance History      OB/GYN negative ob/gyn ROS         Other   (+) arthritis     ROS/Med Hx Other: +UTI  +Urosepsis                                          Anesthesia Plan    ASA 4     general   (Risks and benefits discussed including risk of aspiration, recall and dental damage. All patient questions answered. Will continue with POC.    GA with LMA vs MAC)  intravenous induction   Anesthetic plan and risks discussed with patient.

## 2018-01-24 NOTE — CONSULTS
Consults    Patient Care Team:  Mandy Rhodes MD as PCP - General (Internal Medicine)    Chief complaint: Stone with hematuria    Subjective     History of Present Illness  This 82-year-old lady presented to the emergency room in [place] complaining of abdominal pain and nausea with a history of recent urinary tract infections treated with 3 rounds of antibiotics.  She was found to have grossly bloody urine and a CT scan revealed a calculus in her right distal ureter.  I have reviewed these films and discussed them with the family.  She has no history of kidney stones although her grandchildren have him.  She's been on blood thinner for decades because of chronic atrial fibrillation.  Most recently she's been taking aspirin and Pradaxa.  Its not clear if these recent urinary tract infections were documented with catheter specimen cultures or just based on blood in the urine.  The current specimen is positive for blood but has only trace bacteria and of course culture is pending.  The patient's workup also included a chest x-ray which reveals an infiltrate so if she is septic it could be from the pneumonia as well as urinary tract.  Review of Systems   1. Constitutional: Subjective fever with chills.  No diaphoresis, but progressive worsening of malaise/fatigue.  2. HENT: No dysphagia; no changes to vision/hearing/smell/taste; no epistaxis  3. Eyes: Negative for redness and visual disturbance.   4. Respiratory: negative for shortness of breath. Negative for chest pain . Negative for cough and chest tightness.   5. Cardiovascular: Negative for chest pain and palpitations.   6. Gastrointestinal: Negative for abdominal distention, abdominal pain and blood in stool.  Decreased appetite with resultant poor by mouth intake.  7. Endocrine: Negative for cold intolerance and heat intolerance.   8. Genitourinary: Decreased frequency and volume of urination, no defined hematuria.   9. Musculoskeletal: Chronic arthralgias,  with new onset right lower back pain and myalgia.   10. Skin: Negative for color change, rash and wound.   11. Neurological: Negative for syncope, weakness and headaches.   12. Hematological: Negative for adenopathy. Does not bruise/bleed easily.   13. Psychiatric/Behavioral: Negative for confusion. The patient is not nervous/anxious.      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   1/23/2018 at Unknown time   • dabigatran etexilate (PRADAXA) 75 MG capsule Take 75 mg by mouth 2 (Two) Times a Day.   1/23/2018 at Unknown time   • docusate sodium (COLACE) 100 MG capsule Take 100 mg by mouth 2 (Two) Times a Day As Needed for Constipation.      • furosemide (LASIX) 20 MG tablet Take 20 mg by mouth Every Other Day.   1/23/2018 at Unknown time   • furosemide (LASIX) 40 MG tablet Take 40 mg by mouth Every Other Day.   1/23/2018 at Unknown time   • isosorbide mononitrate (IMDUR) 30 MG 24 hr tablet Take 30 mg by mouth 2 (Two) Times a Day.   1/23/2018 at Unknown time   • metoprolol tartrate (LOPRESSOR) 50 MG tablet Take 75 mg by mouth 2 (Two) Times a Day.   1/23/2018 at Unknown time   • Multiple Vitamins-Minerals (CENTRUM SILVER 50+WOMEN PO) Take 1 tablet by mouth Daily.   1/23/2018 at Unknown time   • potassium chloride (K-DUR) 10 MEQ  CR tablet Take 10 mEq by mouth Daily.   1/23/2018 at Unknown time   • pravastatin (PRAVACHOL) 20 MG tablet Take 20 mg by mouth Daily.   1/23/2018 at Unknown time   • ranolazine (RANEXA) 500 MG 12 hr tablet Take 500 mg by mouth 2 (Two) Times a Day.   1/23/2018 at Unknown time   • tolterodine LA (DETROL LA) 4 MG 24 hr capsule Take 4 mg by mouth Daily.   1/23/2018 at Unknown time   , Scheduled Meds:    aspirin 81 mg Oral Daily   atorvastatin 10 mg Oral Daily   ceftriaxone 1 g Intravenous Q24H   dabigatran etexilate 75 mg Oral Q12H   isosorbide mononitrate 30 mg Oral Q24H   levoFLOXacin 500 mg Intravenous On Call to OR   metoprolol tartrate 75 mg Oral Q12H   multivitamin with minerals 1 tablet Oral Daily   ranolazine 500 mg Oral Q12H   , Continuous Infusions:    sodium chloride 100 mL/hr Last Rate: 100 mL/hr (01/24/18 0218)   , PRN Meds:  docusate sodium  •  Morphine **AND** naloxone  •  ondansetron  •  sodium chloride and Allergies:  Codeine    Objective      Vital Signs  Temp:  [98 °F (36.7 °C)-98.2 °F (36.8 °C)] 98 °F (36.7 °C)  Heart Rate:  [100-118] 102  Resp:  [24-27] 27  BP: (117-148)/(70-96) 148/80    Physical Exam  General Appearance: alert, oriented x 3, no acute distress.Mildly ill-appearing female, pleasant during examination and questioning.  Skin: warm and dry.  Mildly decreased skin turgor.  Age-related atrophy of the skin.  HEENT: Atraumatic.  pupils round and reactive to light and accommodation, oral mucosa pink and moist.  Nares patent without epistaxis.  External auditory canals are patent tympanic membranes intact.  Neck: supple, no JVD, trachea midline.  No thyromegaly  Lungs: Rhonchus breath sounds to right lower lung base, fine sandpaper-like crackles to right base as well.  Audible air exchange noted all other lung fields  Heart: Heart rate ranges between  bpm during my exam, normal S1 and S2, no S3, no rub.  AICD noted subclavicular  Abdomen: soft, diffusely-tender, no palpable bladder,  present bowel sounds to auscultation ×4.  No guarding or rigidity.  No defined CVA tenderness.  Extremities: no clubbing, cyanosis or edema.  Good range of motion actively and passively.  Symmetric muscle strength and development.  Thin, frail build.  Neuro: normal speech and mental status.  Cranial nerves II through XII intact.  No anosmia. DTR 2+; proprioception intact.  No focal motor/sensory deficits.     Results Review:    I reviewed the patient's new clinical results.  I reviewed the patient's new imaging results and agree with the interpretation.  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  I suggested inserting a ureteral stent to relieve the obstruction but if the stone is easy to remove she'll undergo laser lithotripsy extraction and insertion of the stent today.  Her antibiotics include Rocephin but she will also be covered with Levaquin.  I discussed the patients findings and my recommendations with patient, family and nursing staff    Camron Taveras MD  01/24/18  9:01 AM    Time: Critical care 70 min

## 2018-01-24 NOTE — ANESTHESIA PROCEDURE NOTES
Airway  Urgency: elective    Airway not difficult    General Information and Staff    Patient location during procedure: OR  CRNA: CHEO SMITH    Indications and Patient Condition  Indications for airway management: airway protection    Preoxygenated: yes  Mask difficulty assessment: 1 - vent by mask    Final Airway Details  Final airway type: supraglottic airway      Successful airway: classic  Size 4    Number of attempts at approach: 1    Additional Comments  Cuff pressure/leak less than [id number]

## 2018-01-25 LAB
ALBUMIN SERPL-MCNC: 2.8 G/DL (ref 3.5–5)
ALBUMIN/GLOB SERPL: 0.8 G/DL (ref 1–2)
ALP SERPL-CCNC: 64 U/L (ref 38–126)
ALT SERPL W P-5'-P-CCNC: 20 U/L (ref 13–69)
ANION GAP SERPL CALCULATED.3IONS-SCNC: 10.2 MMOL/L
AST SERPL-CCNC: 13 U/L (ref 15–46)
BACTERIA SPEC AEROBE CULT: NO GROWTH
BASOPHILS # BLD AUTO: 0.02 10*3/MM3 (ref 0–0.2)
BASOPHILS NFR BLD AUTO: 0.2 % (ref 0–2.5)
BILIRUB SERPL-MCNC: 0.8 MG/DL (ref 0.2–1.3)
BUN BLD-MCNC: 17 MG/DL (ref 7–20)
BUN/CREAT SERPL: 21.3 (ref 7.1–23.5)
CA-I SERPL ISE-MCNC: 5.7 MG/DL (ref 4.5–5.6)
CALCIUM SPEC-SCNC: 9.3 MG/DL (ref 8.4–10.2)
CHLORIDE SERPL-SCNC: 109 MMOL/L (ref 98–107)
CO2 SERPL-SCNC: 28 MMOL/L (ref 26–30)
CREAT BLD-MCNC: 0.8 MG/DL (ref 0.6–1.3)
DEPRECATED RDW RBC AUTO: 55.9 FL (ref 37–54)
EOSINOPHIL # BLD AUTO: 0.31 10*3/MM3 (ref 0–0.7)
EOSINOPHIL NFR BLD AUTO: 3 % (ref 0–7)
ERYTHROCYTE [DISTWIDTH] IN BLOOD BY AUTOMATED COUNT: 15.2 % (ref 11.5–14.5)
GFR SERPL CREATININE-BSD FRML MDRD: 69 ML/MIN/1.73
GLOBULIN UR ELPH-MCNC: 3.3 GM/DL
GLUCOSE BLD-MCNC: 86 MG/DL (ref 74–98)
HCT VFR BLD AUTO: 31.8 % (ref 37–47)
HGB BLD-MCNC: 9.8 G/DL (ref 12–16)
IMM GRANULOCYTES # BLD: 0.05 10*3/MM3 (ref 0–0.06)
IMM GRANULOCYTES NFR BLD: 0.5 % (ref 0–0.6)
LYMPHOCYTES # BLD AUTO: 1.32 10*3/MM3 (ref 0.6–3.4)
LYMPHOCYTES NFR BLD AUTO: 12.8 % (ref 10–50)
MCH RBC QN AUTO: 31.2 PG (ref 27–31)
MCHC RBC AUTO-ENTMCNC: 30.8 G/DL (ref 30–37)
MCV RBC AUTO: 101.3 FL (ref 81–99)
MONOCYTES # BLD AUTO: 0.41 10*3/MM3 (ref 0–0.9)
MONOCYTES NFR BLD AUTO: 4 % (ref 0–12)
NEUTROPHILS # BLD AUTO: 8.19 10*3/MM3 (ref 2–6.9)
NEUTROPHILS NFR BLD AUTO: 79.5 % (ref 37–80)
NRBC BLD MANUAL-RTO: 0 /100 WBC (ref 0–0)
PLATELET # BLD AUTO: 120 10*3/MM3 (ref 130–400)
PMV BLD AUTO: 11.7 FL (ref 6–12)
POTASSIUM BLD-SCNC: 4.2 MMOL/L (ref 3.5–5.1)
PROT SERPL-MCNC: 6.1 G/DL (ref 6.3–8.2)
RBC # BLD AUTO: 3.14 10*6/MM3 (ref 4.2–5.4)
SODIUM BLD-SCNC: 143 MMOL/L (ref 137–145)
WBC NRBC COR # BLD: 10.3 10*3/MM3 (ref 4.8–10.8)

## 2018-01-25 PROCEDURE — 99232 SBSQ HOSP IP/OBS MODERATE 35: CPT | Performed by: FAMILY MEDICINE

## 2018-01-25 PROCEDURE — 25010000002 ONDANSETRON PER 1 MG: Performed by: FAMILY MEDICINE

## 2018-01-25 PROCEDURE — 85025 COMPLETE CBC W/AUTO DIFF WBC: CPT | Performed by: FAMILY MEDICINE

## 2018-01-25 PROCEDURE — 99231 SBSQ HOSP IP/OBS SF/LOW 25: CPT | Performed by: UROLOGY

## 2018-01-25 PROCEDURE — 80053 COMPREHEN METABOLIC PANEL: CPT | Performed by: FAMILY MEDICINE

## 2018-01-25 PROCEDURE — 25010000002 FUROSEMIDE PER 20 MG: Performed by: FAMILY MEDICINE

## 2018-01-25 PROCEDURE — 25010000002 CEFTRIAXONE IN SWFI 1 GRAM/10ML IV PUSH SYRINGE (SIMPLE): Performed by: FAMILY MEDICINE

## 2018-01-25 PROCEDURE — 94799 UNLISTED PULMONARY SVC/PX: CPT

## 2018-01-25 RX ORDER — FUROSEMIDE 10 MG/ML
40 INJECTION INTRAMUSCULAR; INTRAVENOUS ONCE
Status: DISCONTINUED | OUTPATIENT
Start: 2018-01-25 | End: 2018-01-25

## 2018-01-25 RX ORDER — FUROSEMIDE 10 MG/ML
20 INJECTION INTRAMUSCULAR; INTRAVENOUS ONCE
Status: COMPLETED | OUTPATIENT
Start: 2018-01-25 | End: 2018-01-25

## 2018-01-25 RX ADMIN — ISOSORBIDE MONONITRATE 30 MG: 30 TABLET, EXTENDED RELEASE ORAL at 08:55

## 2018-01-25 RX ADMIN — DABIGATRAN ETEXILATE MESYLATE 75 MG: 75 CAPSULE ORAL at 08:56

## 2018-01-25 RX ADMIN — ATORVASTATIN CALCIUM 10 MG: 10 TABLET, FILM COATED ORAL at 08:55

## 2018-01-25 RX ADMIN — METOPROLOL TARTRATE 75 MG: 50 TABLET ORAL at 08:55

## 2018-01-25 RX ADMIN — CEFTRIAXONE SODIUM 1 G: 1 INJECTION, POWDER, FOR SOLUTION INTRAMUSCULAR; INTRAVENOUS at 18:14

## 2018-01-25 RX ADMIN — RANOLAZINE 500 MG: 500 TABLET, FILM COATED, EXTENDED RELEASE ORAL at 08:56

## 2018-01-25 RX ADMIN — ASPIRIN 81 MG: 81 TABLET, COATED ORAL at 08:55

## 2018-01-25 RX ADMIN — FUROSEMIDE 20 MG: 10 INJECTION, SOLUTION INTRAMUSCULAR; INTRAVENOUS at 13:21

## 2018-01-25 RX ADMIN — DABIGATRAN ETEXILATE MESYLATE 75 MG: 75 CAPSULE ORAL at 21:15

## 2018-01-25 RX ADMIN — ONDANSETRON 4 MG: 2 INJECTION INTRAMUSCULAR; INTRAVENOUS at 10:25

## 2018-01-25 RX ADMIN — METOPROLOL TARTRATE 75 MG: 50 TABLET ORAL at 21:15

## 2018-01-25 RX ADMIN — MULTIPLE VITAMINS W/ MINERALS TAB 1 TABLET: TAB at 08:55

## 2018-01-25 RX ADMIN — RANOLAZINE 500 MG: 500 TABLET, FILM COATED, EXTENDED RELEASE ORAL at 21:16

## 2018-01-25 NOTE — PROGRESS NOTES
Continued Stay Note  BRIAN Sellers     Patient Name: Matthew Fishman  MRN: [id number]  Today's Date: 1/25/2018    Admit Date: 1/24/2018          Discharge Plan       01/25/18 1348    Case Management/Social Work Plan    Additional Comments Spoke to pt briefly she is awake discharge plan is  to  return home with assistance of daughter               Discharge Codes     None        Expected Discharge Date and Time     Expected Discharge Date Expected Discharge Time    Jan 28, 2018             Chyna Alcaraz

## 2018-01-25 NOTE — PLAN OF CARE
Problem: Infection, Risk/Actual (Adult)  Goal: Identify Related Risk Factors and Signs and Symptoms   01/25/18 0018   Infection, Risk/Actual   Infection, Risk/Actual: Related Risk Factors chronic illness/condition;poor personal hygiene;medication effects;surgery/procedure;skin integrity impairment   Signs and Symptoms (Infection, Risk/Actual) body temperature changes;heart rate increase;cultures positive;lab value changes;weakness     Goal: Infection Prevention/Resolution   01/25/18 0018   Infection, Risk/Actual (Adult)   Infection Prevention/Resolution making progress toward outcome

## 2018-01-25 NOTE — PROGRESS NOTES
LOS: 1 day   Patient Care Team:  Mandy Rhodes MD as PCP - General (Internal Medicine)    Chief Complaint: Kidney stone, urinary tract infection    Subjective     History of Present Illness  This patient was transferred from the [facility] emergency room with a diagnosis of urosepsis and 7 mm ureteral calculus.  She was treated with Rocephin and Levaquin on her arrival to the [facility] and the following morning and taken to the operating room where she underwent laser lithotripsy of a distal right ureteral calculus.  She also had insertion of ureteral stent that'll need to remain in for 5 days.  The patient has a history of having been treated on 3 occasions very recently for urinary tract infections but I suspect this was based on blood in the urine and not positive catheter urine cultures.  Her urine culture here as well as her blood cultures were negative.  Subjective    History taken from: patient family    Objective     Vital Signs  Temp:  [97.7 °F (36.5 °C)-98.3 °F (36.8 °C)] 98.1 °F (36.7 °C)  Heart Rate:  [] 113  Resp:  [16-24] 18  BP: ()/(56-94) 126/58    Objective    Results Review:     I reviewed the patient's new clinical results.  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  I would recommend discharge home  on Macrodantin 100 mg twice a day and return to the office next week for removal of her ureteral stent.  Camron Taveras MD  01/25/18  8:41 AM    Time: Critical care 30 min

## 2018-01-26 VITALS
HEART RATE: 114 BPM | BODY MASS INDEX: 35.23 KG/M2 | SYSTOLIC BLOOD PRESSURE: 125 MMHG | OXYGEN SATURATION: 97 % | HEIGHT: 60 IN | TEMPERATURE: 97.8 F | RESPIRATION RATE: 17 BRPM | WEIGHT: 179.44 LBS | DIASTOLIC BLOOD PRESSURE: 61 MMHG

## 2018-01-26 LAB
ANION GAP SERPL CALCULATED.3IONS-SCNC: 6.6 MMOL/L
BASOPHILS # BLD AUTO: 0.03 10*3/MM3 (ref 0–0.2)
BASOPHILS NFR BLD AUTO: 0.3 % (ref 0–2.5)
BUN BLD-MCNC: 14 MG/DL (ref 7–20)
BUN/CREAT SERPL: 20 (ref 7.1–23.5)
CALCIUM SPEC-SCNC: 9.7 MG/DL (ref 8.4–10.2)
CHLORIDE SERPL-SCNC: 108 MMOL/L (ref 98–107)
CO2 SERPL-SCNC: 31 MMOL/L (ref 26–30)
CREAT BLD-MCNC: 0.7 MG/DL (ref 0.6–1.3)
DEPRECATED RDW RBC AUTO: 54.4 FL (ref 37–54)
EOSINOPHIL # BLD AUTO: 0.55 10*3/MM3 (ref 0–0.7)
EOSINOPHIL NFR BLD AUTO: 6.2 % (ref 0–7)
ERYTHROCYTE [DISTWIDTH] IN BLOOD BY AUTOMATED COUNT: 14.7 % (ref 11.5–14.5)
GFR SERPL CREATININE-BSD FRML MDRD: 80 ML/MIN/1.73
GLUCOSE BLD-MCNC: 88 MG/DL (ref 74–98)
HCT VFR BLD AUTO: 30.7 % (ref 37–47)
HGB BLD-MCNC: 9.8 G/DL (ref 12–16)
IMM GRANULOCYTES # BLD: 0.03 10*3/MM3 (ref 0–0.06)
IMM GRANULOCYTES NFR BLD: 0.3 % (ref 0–0.6)
LYMPHOCYTES # BLD AUTO: 1.49 10*3/MM3 (ref 0.6–3.4)
LYMPHOCYTES NFR BLD AUTO: 16.7 % (ref 10–50)
MAGNESIUM SERPL-MCNC: 1.9 MG/DL (ref 1.6–2.3)
MCH RBC QN AUTO: 31.8 PG (ref 27–31)
MCHC RBC AUTO-ENTMCNC: 31.9 G/DL (ref 30–37)
MCV RBC AUTO: 99.7 FL (ref 81–99)
MONOCYTES # BLD AUTO: 0.41 10*3/MM3 (ref 0–0.9)
MONOCYTES NFR BLD AUTO: 4.6 % (ref 0–12)
NEUTROPHILS # BLD AUTO: 6.39 10*3/MM3 (ref 2–6.9)
NEUTROPHILS NFR BLD AUTO: 71.9 % (ref 37–80)
NRBC BLD MANUAL-RTO: 0 /100 WBC (ref 0–0)
PLATELET # BLD AUTO: 137 10*3/MM3 (ref 130–400)
PMV BLD AUTO: 11.3 FL (ref 6–12)
POTASSIUM BLD-SCNC: 3.6 MMOL/L (ref 3.5–5.1)
RBC # BLD AUTO: 3.08 10*6/MM3 (ref 4.2–5.4)
SODIUM BLD-SCNC: 142 MMOL/L (ref 137–145)
WBC NRBC COR # BLD: 8.9 10*3/MM3 (ref 4.8–10.8)

## 2018-01-26 PROCEDURE — 83735 ASSAY OF MAGNESIUM: CPT | Performed by: FAMILY MEDICINE

## 2018-01-26 PROCEDURE — 97162 PT EVAL MOD COMPLEX 30 MIN: CPT

## 2018-01-26 PROCEDURE — 94799 UNLISTED PULMONARY SVC/PX: CPT

## 2018-01-26 PROCEDURE — 85025 COMPLETE CBC W/AUTO DIFF WBC: CPT | Performed by: FAMILY MEDICINE

## 2018-01-26 PROCEDURE — 80048 BASIC METABOLIC PNL TOTAL CA: CPT | Performed by: FAMILY MEDICINE

## 2018-01-26 PROCEDURE — 99239 HOSP IP/OBS DSCHRG MGMT >30: CPT | Performed by: FAMILY MEDICINE

## 2018-01-26 RX ORDER — NITROFURANTOIN MACROCRYSTALS 100 MG/1
100 CAPSULE ORAL 2 TIMES DAILY WITH MEALS
Qty: 14 CAPSULE | Refills: 0 | Status: SHIPPED | OUTPATIENT
Start: 2018-01-26 | End: 2018-02-02

## 2018-01-26 RX ADMIN — RANOLAZINE 500 MG: 500 TABLET, FILM COATED, EXTENDED RELEASE ORAL at 08:19

## 2018-01-26 RX ADMIN — MULTIPLE VITAMINS W/ MINERALS TAB 1 TABLET: TAB at 08:18

## 2018-01-26 RX ADMIN — METOPROLOL TARTRATE 75 MG: 50 TABLET ORAL at 08:18

## 2018-01-26 RX ADMIN — ASPIRIN 81 MG: 81 TABLET, COATED ORAL at 08:18

## 2018-01-26 RX ADMIN — ISOSORBIDE MONONITRATE 30 MG: 30 TABLET, EXTENDED RELEASE ORAL at 08:18

## 2018-01-26 RX ADMIN — ATORVASTATIN CALCIUM 10 MG: 10 TABLET, FILM COATED ORAL at 08:18

## 2018-01-26 RX ADMIN — DABIGATRAN ETEXILATE MESYLATE 75 MG: 75 CAPSULE ORAL at 08:18

## 2018-01-26 NOTE — THERAPY EVALUATION
Acute Care - Physical Therapy Initial Evaluation   [place]     Patient Name: Matthew Fishman  : 1935  MRN: [id number]  Today's Date: 2018   Onset of Illness/Injury or Date of Surgery Date: 18  Date of Referral to PT: 18  Referring Physician: Mirtha      Admit Date: 2018     Visit Dx:    ICD-10-CM ICD-9-CM   1. Ureteral calculus, right N20.1 592.1   2. Sepsis secondary to UTI A41.9 038.9    N39.0 995.91     599.0   3. 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   • Chronic combined systolic and diastolic heart failure   • Ureterolithiasis, right   • Pleural effusion on right   • Acute respiratory failure with hypoxia   • Ureteral calculus, right     Past Medical History:   Diagnosis Date   • Arthritis    • Atrial fibrillation, chronic    • CHF (congestive heart failure)    • High cholesterol    • History of MI (myocardial infarction)    • HTN (hypertension)    • Hx of CABG 1989    triple   • Hx of cholecystectomy    • Pacemaker    • Previous back surgery      Past Surgical History:   Procedure Laterality Date   • BACK SURGERY     • CHOLECYSTECTOMY     • CORONARY ARTERY BYPASS GRAFT      x3   • URETEROSCOPY LASER LITHOTRIPSY WITH STENT INSERTION N/A 2018    Procedure: URETEROSCOPY LASER LITHOTRIPSY WITH STENT INSERTION;  Surgeon: Camron Taveras MD;  Location: [facility];  Service:           PT ASSESSMENT (last 72 hours)      PT Evaluation       18 0943 18 1600    Rehab Evaluation    Document Type evaluation  -LM     Subjective Information agree to therapy  -LM     Patient Effort, Rehab Treatment good  -LM     Symptoms Noted During/After Treatment  fatigue;shortness of breath  -LM     General Information    Patient Profile Review yes  -LM     Onset of Illness/Injury or Date of Surgery Date 01/24/18  -LM     Referring Physician Mirtha  -LM     General Observations Pt received supine in bed. 2 LPM per n/c..   -LM     Pertinent History Of Current Problem Kidney stone/s/p laser lithotripsy/sepsis d/t UTI;CHF,CAD,AICD,afib,OA,HTN  -LM     Precautions/Limitations fall precautions;oxygen therapy device and L/min  -LM     Prior Level of Function independent:;community mobility  -LM     Equipment Currently Used at Home rollator;walker, rolling;wheelchair;power chair, (recliner lift);shower chair;commode;cane, straight;cane, quad  -LM     Plans/Goals Discussed With patient and family;agreed upon  -LM     Risks Reviewed patient and family:;LOB;increased discomfort  -LM     Benefits Reviewed patient and family:;improve function;increase independence  -LM     Living Environment    Lives With child(kyle), adult  -LM     Living Arrangements apartment  -LM     Home Accessibility no concerns  -LM     Clinical Impression    Date of Referral to PT 01/26/18  -LM     PT Diagnosis Generalized weakness  -LM     Patient/Family Goals Statement Return home.  -LM     Criteria for Skilled Therapeutic Interventions Met yes;treatment indicated  -LM     Rehab Potential good, to achieve stated therapy goals  -LM     Vital Signs    Pretreatment Heart Rate (beats/min) 98  -LM     Intratreatment Heart Rate (beats/min) 142  -LM     Posttreatment Heart Rate (beats/min) 99  -LM     Pre SpO2 (%) 99  -LM     O2 Delivery Pre Treatment supplemental O2   2 LPM  -LM     Intra SpO2 (%) 88  -LM     O2 Delivery Intra Treatment room air  -LM     Post SpO2 (%) 96  -LM     O2 Delivery Post Treatment supplemental O2   2 LPM  -LM     Pain Assessment    Pain Assessment No/denies pain  -LM     Cognitive Assessment/Intervention    Current Cognitive/Communication Assessment functional  -LM     Orientation Status  oriented x 4  -LM     Follows Commands/Answers Questions able to follow multi-step instructions;needs cueing  -LM     Personal Safety decreased awareness, need for assist;decreased awareness, need for safety  -LM     Personal Safety Interventions fall prevention program maintained;gait belt;nonskid shoes/slippers when out of bed  -LM     ROM (Range of Motion)    General ROM no range of motion deficits identified  -LM     MMT (Manual Muscle Testing)    General MMT Assessment upper extremity strength deficits identified;lower extremity strength deficits identified  -LM     General MMT Assessment Detail Grossly 4/5.  -LM     Muscle Tone Assessment    Muscle Tone Assessment  Bilateral Upper Extremities;Bilateral Lower Extremities  -TG    Bilateral Upper Extremities Muscle Tone Assessment  mildly decreased tone  -TG    Bilateral Lower Extremities Muscle Tone Assessment  mildly decreased tone  -TG    Bed Mobility, Assessment/Treatment    Bed Mobility, Assistive Device bed rails;head of bed elevated  -LM     Bed Mob, Supine to Sit, [place] contact guard assist  -LM     Bed Mobility, Impairments strength decreased  -LM     Transfer Assessment/Treatment    Transfers, Bed-Chair [place] contact guard assist  -LM     Transfers, Bed-Chair-Bed, Assist Device rolling walker  -LM     Transfers, Sit-Stand [place] contact guard assist  -LM     Transfers, Stand-Sit [place] contact guard assist  -LM     Transfers, Sit-Stand-Sit, Assist Device rolling walker  -LM     Transfer, Safety Issues balance decreased during turns;step length decreased;weight-shifting ability decreased  -LM     Transfer, Impairments strength decreased;impaired balance  -LM     Gait Assessment/Treatment    Gait, [place] Level contact guard assist  -LM     Gait, Assistive Device rolling walker  -LM     Gait, Distance (Feet) 158  -LM     Gait, Safety Issues balance decreased during turns;sequencing ability decreased;step length  decreased;supplemental O2  -LM     Gait, Impairments strength decreased;impaired balance  -LM     Positioning and Restraints    Pre-Treatment Position in bed  -LM     Post Treatment Position chair  -LM     In Chair reclined;call light within reach;encouraged to call for assist;with family/caregiver  -LM       01/25/18 1200 01/25/18 0800    Muscle Tone Assessment    Muscle Tone Assessment Bilateral Upper Extremities;Bilateral Lower Extremities  -TG Bilateral Upper Extremities;Bilateral Lower Extremities  -TG    Bilateral Upper Extremities Muscle Tone Assessment mildly decreased tone  -TG mildly decreased tone  -TG    Bilateral Lower Extremities Muscle Tone Assessment mildly decreased tone  -TG mildly decreased tone  -TG      01/24/18 1600 01/24/18 1551    General Information    Equipment Currently Used at Home  cane, quad;walker, rolling;shower chair  -AS    Living Environment    Lives With  child(ykle), adult  -AS    Living Arrangements  apartment  -AS    Home Accessibility  no concerns;bed and bath on same level  -AS    Stair Railings at Home  none  -AS    Type of Financial/Environmental Concern  none  -AS    Transportation Available  family or friend will provide  -AS    Muscle Tone Assessment    Muscle Tone Assessment Bilateral Upper Extremities;Bilateral Lower Extremities  -LB     Bilateral Upper Extremities Muscle Tone Assessment mildly decreased tone  -LB     Bilateral Lower Extremities Muscle Tone Assessment mildly decreased tone  -LB       01/24/18 1200 01/24/18 0800    Muscle Tone Assessment    Muscle Tone Assessment Bilateral Upper Extremities;Bilateral Lower Extremities  -LB Bilateral Upper Extremities;Bilateral Lower Extremities  -LB    Bilateral Upper Extremities Muscle Tone Assessment mildly decreased tone  -LB mildly decreased tone  -LB    Bilateral Lower Extremities Muscle Tone Assessment mildly decreased tone  -LB mildly decreased tone  -LB      01/24/18 0100       General Information    Equipment  Currently Used at Home walker, rolling;shower chair;cane, quad  -BS     Living Environment    Lives With child(kyle), adult  -BS     Living Arrangements apartment  -BS     Home Accessibility no concerns  -BS     Stair Railings at Home none  -BS     Type of Financial/Environmental Concern none  -BS     Transportation Available family or friend will provide  -BS       User Key  (r) = Recorded By, (t) = Taken By, (c) = Cosigned By    Initials Name Provider Type    TG Melvi Lundberg, RN Registered Nurse    LB Diann Whitehead, ELIANE Registered Nurse    ADEBAYO Watson, RN Registered Nurse    ZULY Jasso, PT Physical Therapist    AS Usha Ramos           Physical Therapy Education     Title: PT OT SLP Therapies (Done)     Topic: Physical Therapy (Done)     Point: Mobility training (Done)    Learning Progress Summary    Learner Readiness Method Response Comment Documented by Status   Patient Acceptance E VU Purpose of PT/POC; proper use of RW for safe transfers/ambulation.  01/26/18 1159 Done               Point: Home exercise program (Done)    Learning Progress Summary    Learner Readiness Method Response Comment Documented by Status   Patient Acceptance E VU Purpose of PT/POC; proper use of RW for safe transfers/ambulation.  01/26/18 1159 Done               Point: Precautions (Done)    Learning Progress Summary    Learner Readiness Method Response Comment Documented by Status   Patient Acceptance E VU Purpose of PT/POC; proper use of RW for safe transfers/ambulation.  01/26/18 1159 Done                      User Key     Initials Effective Dates Name Provider Type Discipline     10/26/16 -  Veronica Jasso, PT Physical Therapist PT                PT Recommendation and Plan  Anticipated Discharge Disposition: home with home health, home with assist  Planned Therapy Interventions: balance training, bed mobility training, gait training, home exercise program, patient/family education,  strengthening, transfer training  PT Frequency: daily  Plan of Care Review  Plan Of Care Reviewed With: patient, family  Outcome Summary/Follow up Plan: PT pauly completed.  Patient presents with decreased strength, balance, endurance, safety and independence with mobility.  She is expected to benefit from continued skilled PT intervention to improve her overall functional mobility status prior to D/C.          IP PT Goals       01/26/18 1159          Bed Mobility PT LTG    Bed Mobility PT LTG, Date Established 01/26/18  -LM      Bed Mobility PT LTG, Time to Achieve 2 wks  -LM      Bed Mobility PT LTG, Activity Type supine to sit/sit to supine;roll left/roll right  -LM      Bed Mobility PT LTG, [place] Level conditional independence  -LM      Bed Mobility PT Goal  LTG, Assist Device bed rails  -LM      Bed Mobility PT LTG, Outcome goal ongoing  -LM      Transfer Training PT LTG    Transfer Training PT LTG, Date Established 01/26/18  -LM      Transfer Training PT LTG, Time to Achieve 2 wks  -LM      Transfer Training PT LTG, Activity Type sit to stand/stand to sit;bed to chair /chair to bed  -LM      Transfer Training PT LTG, [place] Level supervision required  -LM      Transfer Training PT LTG, Assist Device walker, rolling  -LM      Transfer Training PT LTG, Outcome goal ongoing  -LM      Gait Training PT LTG    Gait Training Goal PT LTG, Date Established 01/26/18  -LM      Gait Training Goal PT LTG, Time to Achieve 2 wks  -LM      Gait Training Goal PT LTG, [place] Level supervision required  -LM      Gait Training Goal PT LTG, Assist Device walker, rolling  -LM      Gait Training Goal PT LTG, Distance to Achieve 300  -LM      Gait Training Goal PT LTG, Additional Goal Maintain O2 sats above 90%.  -LM      Gait Training Goal PT LTG, Outcome goal ongoing  -LM        User Key  (r) = Recorded By, (t) = Taken By, (c) = Cosigned By    Initials Name Provider Type    ZULY Jasso, PT Physical Therapist                 Outcome Measures       01/26/18 0943          How much help from another person do you currently need...    Turning from your back to your side while in flat bed without using bedrails? 3  -LM      Moving from lying on back to sitting on the side of a flat bed without bedrails? 3  -LM      Moving to and from a bed to a chair (including a wheelchair)? 3  -LM      Standing up from a chair using your arms (e.g., wheelchair, bedside chair)? 3  -LM      Climbing 3-5 steps with a railing? 3  -LM      To walk in hospital room? 3  -LM      AM-PAC 6 Clicks Score 18  -LM      Functional Assessment    Outcome Measure Options AM-PAC 6 Clicks Basic Mobility (PT)  -LM        User Key  (r) = Recorded By, (t) = Taken By, (c) = Cosigned By    Initials Name Provider Type    ZULY Jasso PT Physical Therapist           Time Calculation:         PT Charges       01/26/18 1202          Time Calculation    Start Time 0943  -LM      PT Received On 01/26/18  -LM      PT Goal Re-Cert Due Date 02/05/18  -LM        User Key  (r) = Recorded By, (t) = Taken By, (c) = Cosigned By    Initials Name Provider Type    ZULY Jasso PT Physical Therapist          Therapy Charges for Today     Code Description Service Date Service Provider Modifiers Qty    [id number]  PT EVAL MOD COMPLEXITY 4 1/26/2018 Veronica Jasso, PT GP 1          PT G-Codes  Outcome Measure Options: AM-PAC 6 Clicks Basic Mobility (PT)      Veronica Jasso PT  1/26/2018

## 2018-01-26 NOTE — PLAN OF CARE
Problem: Patient Care Overview (Adult)  Goal: Plan of Care Review  Outcome: Ongoing (interventions implemented as appropriate)   01/26/18 1159   Coping/Psychosocial Response Interventions   Plan Of Care Reviewed With patient;family   Outcome Evaluation   Outcome Summary/Follow up Plan PT pauly completed. Patient presents with decreased strength, balance, endurance, safety and independence with mobility. She is expected to benefit from continued skilled PT intervention to improve her overall functional mobility status prior to D/C.       Problem: Inpatient Physical Therapy  Goal: Bed Mobility Goal LTG- PT  Outcome: Ongoing (interventions implemented as appropriate)   01/26/18 1159   Bed Mobility PT LTG   Bed Mobility PT LTG, Date Established 01/26/18   Bed Mobility PT LTG, Time to Achieve 2 wks   Bed Mobility PT LTG, Activity Type supine to sit/sit to supine;roll left/roll right   Bed Mobility PT LTG, [place] Level conditional independence   Bed Mobility PT Goal LTG, Assist Device bed rails   Bed Mobility PT LTG, Outcome goal ongoing     Goal: Transfer Training Goal 1 LTG- PT  Outcome: Ongoing (interventions implemented as appropriate)   01/26/18 1159   Transfer Training PT LTG   Transfer Training PT LTG, Date Established 01/26/18   Transfer Training PT LTG, Time to Achieve 2 wks   Transfer Training PT LTG, Activity Type sit to stand/stand to sit;bed to chair /chair to bed   Transfer Training PT LTG, [place] Level supervision required   Transfer Training PT LTG, Assist Device walker, rolling   Transfer Training PT LTG, Outcome goal ongoing     Goal: Gait Training Goal LTG- PT  Outcome: Ongoing (interventions implemented as appropriate)   01/26/18 1159   Gait Training PT LTG   Gait Training Goal PT LTG, Date Established 01/26/18   Gait Training Goal PT LTG, Time to Achieve 2 wks   Gait Training Goal PT LTG, [place] Level supervision required   Gait Training Goal PT LTG, Assist Device walker, rolling    Gait Training Goal PT LTG, Distance to Achieve 300   Gait Training Goal PT LTG, Additional Goal Maintain O2 sats above 90%.   Gait Training Goal PT LTG, Outcome goal ongoing

## 2018-01-26 NOTE — PROGRESS NOTES
Continued Stay Note  BRIAN Sellers     Patient Name: Matthew Fishman  MRN: [id number]  Today's Date: 1/26/2018    Admit Date: 1/24/2018          Discharge Plan       01/26/18 1344    Case Management/Social Work Plan    Additional Comments From list provided pt choose [facility] for oxygen called and faxed to them the [place] office will deliver  to the room [facility]n spoke to [employer] Faxed information to them                Discharge Codes     None        Expected Discharge Date and Time     Expected Discharge Date Expected Discharge Time    Jan 26, 2018             Chyna Alcaraz

## 2018-01-26 NOTE — PLAN OF CARE
Problem: Patient Care Overview (Adult)  Goal: Plan of Care Review  Outcome: Ongoing (interventions implemented as appropriate)   01/26/18 1029   Coping/Psychosocial Response Interventions   Plan Of Care Reviewed With patient;daughter   Patient Care Overview   Progress progress toward functional goals as expected   Outcome Evaluation   Outcome Summary/Follow up Plan VSS, pt to be discharged today       Problem: Infection, Risk/Actual (Adult)  Goal: Identify Related Risk Factors and Signs and Symptoms  Outcome: Ongoing (interventions implemented as appropriate)    Goal: Infection Prevention/Resolution  Outcome: Ongoing (interventions implemented as appropriate)

## 2018-01-26 NOTE — PROGRESS NOTES
[facility]    Name:  Matthew Fishman   Age:  82 y.o.  Sex:  female  :  1935  MRN:  [id number]   Visit Number:  [id number]  Admission Date:  2018  Date Of Service:  18  Primary Care Physician:  Mandy Rhodes MD     LOS: 1 day :      Chief Complaint:  Hypoxia. Follow up UTI sepsis and ureterolithiasis        Subjective / Interval History:   Patient had lithotripsy yesterday and since later in day she hadnt ambulated yet.     Denies chest pain, shortness of breath, cough, abdominal pain.      Review of Systems:     General ROS: Patient denies any fevers, chills or loss of consciousness.  Ophthalmic ROS: Denies any diplopia or transient loss of vision.  ENT ROS: Denies sore throat, nasal congestion or ear pain.   Respiratory ROS: Small cough or shortness of breath.  Cardiovascular ROS: Denies chest pain or palpitations. No history of exertional chest pain.   Gastrointestinal ROS: Denies nausea and vomiting. Mild right sided aching and burning abdominal pain. No diarrhea.  Genito-Urinary ROS: Denies dysuria or hematuria.  Musculoskeletal ROS: Denies back pain. No muscle pain. No calf pain.  Neurological ROS: Denies any focal weakness. No loss of consciousness. Denies any numbness. Denies neck pain.   Dermatological ROS: Denies any redness or pruritis.    Vital Signs:    Temp:  [98 °F (36.7 °C)-98.9 °F (37.2 °C)] 98.9 °F (37.2 °C)  Heart Rate:  [] 127  Resp:  [16-18] 17  BP: ()/(58-72) 115/72    Intake and output:    I/O last 3 completed shifts:  In: 1782 [P.O.:360; I.V.:1422]  Out: 700 [Urine:700]       Physical Examination:    General Appearance:    Alert and cooperative, not in any acute distress.   Head:    Atraumatic and normocephalic, without obvious abnormality.   Eyes:            PERRLA, conjunctivae and sclerae normal, no Icterus. No pallor. Extra-occular movements are within normal limits.   Throat:   No oral lesions, no  thrush, oral mucosa moist.   Neck:   Supple, trachea midline, no thyromegaly   Lungs:     Chest shape is normal. Breath sounds heard bilaterally equally reduced with trace crackles or wheezing. No Pleural rub or bronchial breathing.    Heart:    Normal S1 and S2, no murmur, no gallop, no rub. No JVD   Abdomen:     Normal bowel sounds, no masses, no organomegaly. Soft        non-tender, non-distended, no guarding, no rebound                tenderness   Extremities:   Moves all extremities well, no edema, no cyanosis, no             clubbing   Skin:   No bleeding, bruising or rash.   Neurologic:   Cranial nerves 2 - 12 grossly intact, sensation intact, Motor power is normal and equal bilaterally.   Laboratory results:    Lab Results (last 24 hours)     Procedure Component Value Units Date/Time    Blood Culture With CARMEN - Blood, [[id number]]  (Normal) Collected:  01/24/18 0228    Specimen:  Blood from Arm, Right Updated:  01/25/18 0316     Blood Culture No growth at 24 hours    Comprehensive Metabolic Panel [[id number]]  (Abnormal) Collected:  01/25/18 0447    Specimen:  Blood Updated:  01/25/18 0612     Glucose 86 mg/dL      BUN 17 mg/dL      Creatinine 0.80 mg/dL      Sodium 143 mmol/L      Potassium 4.2 mmol/L      Chloride 109 (H) mmol/L      CO2 28.0 mmol/L      Calcium 9.3 mg/dL      Total Protein 6.1 (L) g/dL      Albumin 2.80 (L) g/dL      ALT (SGPT) 20 U/L      AST (SGOT) 13 (L) U/L      Alkaline Phosphatase 64 U/L      Total Bilirubin 0.8 mg/dL      eGFR Non African Amer 69 mL/min/1.73      Globulin 3.3 gm/dL      A/G Ratio 0.8 (L) g/dL      BUN/Creatinine Ratio 21.3     Anion Gap 10.2 mmol/L     Narrative:       The MDRD GFR formula is only valid for adults with stable renal function between ages 18 and 70.    CBC & Differential [[id number]] Collected:  01/25/18 0447    Specimen:  Blood Updated:  01/25/18 0626    Narrative:       The following orders were created for panel order CBC & Differential.  Procedure                                Abnormality         Status                     ---------                               -----------         ------                     CBC Auto Differential[[id number]]        Abnormal            Final result                 Please view results for these tests on the individual orders.    CBC Auto Differential [[id number]]  (Abnormal) Collected:  01/25/18 0447    Specimen:  Blood Updated:  01/25/18 0626     WBC 10.30 10*3/mm3      RBC 3.14 (L) 10*6/mm3      Hemoglobin 9.8 (L) g/dL      Hematocrit 31.8 (L) %      .3 (H) fL      MCH 31.2 (H) pg      MCHC 30.8 g/dL      RDW 15.2 (H) %      RDW-SD 55.9 (H) fl      MPV 11.7 fL      Platelets 120 (L) 10*3/mm3      Neutrophil % 79.5 %      Lymphocyte % 12.8 %      Monocyte % 4.0 %      Eosinophil % 3.0 %      Basophil % 0.2 %      Immature Grans % 0.5 %      Neutrophils, Absolute 8.19 (H) 10*3/mm3      Lymphocytes, Absolute 1.32 10*3/mm3      Monocytes, Absolute 0.41 10*3/mm3      Eosinophils, Absolute 0.31 10*3/mm3      Basophils, Absolute 0.02 10*3/mm3      Immature Grans, Absolute 0.05 10*3/mm3      nRBC 0.0 /100 WBC     Urine Culture - Urine, Urine, Clean Catch [[id number]]  (Normal) Collected:  01/24/18 0217    Specimen:  Urine from Urine, Clean Catch Updated:  01/25/18 0843     Urine Culture No growth    Calcium, Ionized [[id number]]  (Abnormal) Collected:  01/24/18 0228    Specimen:  Blood Updated:  01/25/18 1521     Ionized Calcium 5.7 (H) mg/dL     Narrative:       Performed at:  [address] [place] [address]  [id number]  : Eladio Sanchez PhD, Phone:  [phone]          I have reviewed the patient's laboratory results.    Radiology results:    Imaging Results (last 24 hours)     ** No results found for the last 24 hours. **          I have reviewed the patient's radiology reports.    Medication Review:     I have reviewed the patients active and prn medications.     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 Gave lasix. Wean oxygen as tolerated.     Per Dr Taveras :    discharge home on Macrodantin 100 mg twice a day and return to the office next week for removal of her ureteral stent.    She will be continued on telemetry. Continue to check daily labs and titrate accordingly. Resume pradaxa.    Start ambulating today. She has chronic debility.     Plan for discharge home tomorrow with home health.        Medication risks and benefits were discussed in detail. Patient reported satisfaction with care delivered and treatment plan.     Alicia Shannon DO  01/25/18  10:09 PM

## 2018-01-26 NOTE — PROGRESS NOTES
Case Management Discharge Note    Final Note: Discharge home with home health     Discharge Placement     No information found        Other:  (private car )    Discharge Codes: 06  Discharged/transferred to home under care of organized home health service organization in anticipation of skilled care

## 2018-01-26 NOTE — DISCHARGE SUMMARY
[facility] HOSPITALIST   DISCHARGE SUMMARY      Name:  Matthew Fishman   Age:  82 y.o.  Sex:  female  :  1935  MRN:  [id number]   Visit Number:  [id number]  Primary Care Physician:  Mandy Rhodes MD  Date of Discharge:  2018  Admission Date:  2018    Discharge Diagnosis:   Sepsis secondary to UTI, improved  Active Problems:    Ureterolithiasis, right, post laser lithotripsy of a distal right ureteral calculus    Chronic atrial fibrillation    Pleural effusion on right    Acute respiratory failure with hypoxia, persistent requiring 2 liter NC   Acute on Chronic combined systolic and diastolic heart failure, improved after restarted lasix    Atherosclerosis of native coronary artery of native heart without angina pectoris    Dyslipidemia    Overactive bladder    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  The patient had a 7 mm obstructing ureterolithiasis with moderate hydro-ureter nephrosis.  The patient takes daily Lasix for combined systolic and diastolic congestive heart failure with a history of CAD and CABG.  The patient did have hypoxia noted by EMS at home and the patient improved with 2 L nasal cannula and was transferred here to [facility] for further evaluation and to see Dr. Didier Taveras with urology.  The patient did also have sepsis complicated by atrial fibrillation with rapid ventricular rate.  She is normally anticoagulated on Pradaxa.  She was admitted to the ICU.  She was continued on metoprolol with further improvement of her heart rate.  She was started on IV Rocephin for urinary tract infection with prior Escherichia coli previously. Final [facility] ER urine culture result showed no growth.  Culture here also urine no growth.        She has been evaluated by PT and safe to ambulate with walker as prior to admission. She is eating, drinking normally and denies further abdominal pain. She denies chest pain, shortness of breath, abdominal pain, edema. She is feeling much better. Follow up with PCP next week and see Dr Taveras next week as directed. Continue Macrodantin 100mg twice daily. With CHF, the patient continues to require oxygen and will be discharged home on 2 liter NC and home health.           Consults:     Consults     Date and Time Order Name Status Description    1/24/2018 0154 Inpatient Consult to Urology            Procedures Performed:    Procedure(s):  URETEROSCOPY LASER LITHOTRIPSY WITH STENT INSERTION         Vital Signs:    Temp:  [97.8 °F (36.6 °C)-98.9 °F (37.2 °C)] 97.8 °F (36.6 °C)  Heart Rate:  [] 114  Resp:  [16-18] 17  BP: (110-135)/(61-78) 125/61    Physical Exam:      General Appearance:    Alert, cooperative, in no acute distress   Head:    Normocephalic, without obvious abnormality, atraumatic   Eyes:            Lids and lashes normal, conjunctivae and sclerae normal, no   icterus, no pallor, corneas clear, PERRLA   Ears:    Ears appear intact with no abnormalities noted   Throat:   No oral lesions, no thrush, oral mucosa moist   Neck:   No adenopathy, supple, trachea midline, no thyromegaly, no     carotid bruit, no JVD   Back:     No kyphosis present, no scoliosis present, no skin lesions,       erythema or scars, no tenderness to percussion or                   palpation,   range of motion normal   Lungs:     Clear to auscultation,respirations regular, even and                   unlabored    Heart:    Regular rhythm and normal rate, normal S1 and S2, no            murmur, no gallop, no rub, no click   Breast Exam:    Deferred   Abdomen:     Normal bowel sounds, no masses, no organomegaly, soft        non-tender, non-distended, no guarding, no rebound                  tenderness   Genitalia:    Deferred   Extremities:   Moves all extremities well, no edema, no cyanosis, no              redness   Pulses:   Pulses palpable and equal bilaterally   Skin:   No bleeding, bruising or rash   Lymph nodes:   No palpable adenopathy   Neurologic:   Cranial nerves 2 - 12 grossly intact, sensation intact, DTR        present and equal bilaterally         Pertinent Lab Results:     Lab Results (all)     Procedure Component Value Units Date/Time    Lactic Acid, Plasma [[id number]]  (Normal) Collected:  01/24/18 0228    Specimen:  Blood Updated:  01/24/18 0324     Lactate 1.2 mmol/L     Magnesium [[id number]]  (Normal) Collected:  01/24/18 0228    Specimen:  Blood Updated:  01/24/18 0326     Magnesium 2.1 mg/dL     Comprehensive Metabolic Panel [[id number]]  (Abnormal) Collected:  01/24/18 0228    Specimen:  Blood Updated:  01/24/18 0326     Glucose 119 (H) mg/dL      BUN 17 mg/dL      Creatinine 1.20 mg/dL      Sodium 143 mmol/L      Potassium 4.0 mmol/L      Chloride 104 mmol/L      CO2 29.0 mmol/L      Calcium 10.2 mg/dL      Total Protein 7.3 g/dL      Albumin 3.70 g/dL      ALT (SGPT) 28 U/L      AST (SGOT) 19 U/L      Alkaline Phosphatase 74 U/L      Total Bilirubin 0.9 mg/dL      eGFR Non African Amer 43 (L) mL/min/1.73      Globulin 3.6 gm/dL      A/G Ratio 1.0 g/dL      BUN/Creatinine Ratio 14.2     Anion Gap 14.0 mmol/L     Narrative:       The MDRD GFR formula is only valid for adults with stable renal function between ages 18 and 70.    Stone Analysis - Calculus, Kidney, Right [[id number]] Collected:  01/24/18 1430    Specimen:  Calculus from Kidney, Right Updated:  01/24/18 1458    Comprehensive Metabolic Panel [[id number]]  (Abnormal) Collected:  01/25/18 0447    Specimen:  Blood Updated:  01/25/18 0612     Glucose 86 mg/dL      BUN 17 mg/dL      Creatinine 0.80 mg/dL      Sodium 143 mmol/L      Potassium 4.2 mmol/L      Chloride 109 (H) mmol/L      CO2 28.0 mmol/L      Calcium 9.3 mg/dL       Total Protein 6.1 (L) g/dL      Albumin 2.80 (L) g/dL      ALT (SGPT) 20 U/L      AST (SGOT) 13 (L) U/L      Alkaline Phosphatase 64 U/L      Total Bilirubin 0.8 mg/dL      eGFR Non African Amer 69 mL/min/1.73      Globulin 3.3 gm/dL      A/G Ratio 0.8 (L) g/dL      BUN/Creatinine Ratio 21.3     Anion Gap 10.2 mmol/L     Narrative:       The MDRD GFR formula is only valid for adults with stable renal function between ages 18 and 70.    CBC & Differential [[id number]] Collected:  01/25/18 0447    Specimen:  Blood Updated:  01/25/18 0626    Narrative:       The following orders were created for panel order CBC & Differential.  Procedure                               Abnormality         Status                     ---------                               -----------         ------                     CBC Auto Differential[[id number]]        Abnormal            Final result                 Please view results for these tests on the individual orders.    CBC Auto Differential [[id number]]  (Abnormal) Collected:  01/25/18 0447    Specimen:  Blood Updated:  01/25/18 0626     WBC 10.30 10*3/mm3      RBC 3.14 (L) 10*6/mm3      Hemoglobin 9.8 (L) g/dL      Hematocrit 31.8 (L) %      .3 (H) fL      MCH 31.2 (H) pg      MCHC 30.8 g/dL      RDW 15.2 (H) %      RDW-SD 55.9 (H) fl      MPV 11.7 fL      Platelets 120 (L) 10*3/mm3      Neutrophil % 79.5 %      Lymphocyte % 12.8 %      Monocyte % 4.0 %      Eosinophil % 3.0 %      Basophil % 0.2 %      Immature Grans % 0.5 %      Neutrophils, Absolute 8.19 (H) 10*3/mm3      Lymphocytes, Absolute 1.32 10*3/mm3      Monocytes, Absolute 0.41 10*3/mm3      Eosinophils, Absolute 0.31 10*3/mm3      Basophils, Absolute 0.02 10*3/mm3      Immature Grans, Absolute 0.05 10*3/mm3      nRBC 0.0 /100 WBC     Urine Culture - Urine, Urine, Clean Catch [[id number]]  (Normal) Collected:  01/24/18 0217    Specimen:  Urine from Urine, Clean Catch Updated:  01/25/18 0843     Urine Culture No growth     Calcium, Ionized [[id number]]  (Abnormal) Collected:  01/24/18 0228    Specimen:  Blood Updated:  01/25/18 1521     Ionized Calcium 5.7 (H) mg/dL     Narrative:       Performed at:  [address] [place] [address]  [id number]  : Eladio Sanchez PhD, Phone:  [phone]    Blood Culture With CARMEN - Blood, [[id number]]  (Normal) Collected:  01/24/18 0228    Specimen:  Blood from Arm, Right Updated:  01/26/18 0316     Blood Culture No growth at 2 days    CBC & Differential [[id number]] Collected:  01/26/18 0624    Specimen:  Blood Updated:  01/26/18 0653    Narrative:       The following orders were created for panel order CBC & Differential.  Procedure                               Abnormality         Status                     ---------                               -----------         ------                     CBC Auto Differential[[id number]]        Abnormal            Final result                 Please view results for these tests on the individual orders.    CBC Auto Differential [[id number]]  (Abnormal) Collected:  01/26/18 0624    Specimen:  Blood Updated:  01/26/18 0653     WBC 8.90 10*3/mm3      RBC 3.08 (L) 10*6/mm3      Hemoglobin 9.8 (L) g/dL      Hematocrit 30.7 (L) %      MCV 99.7 (H) fL      MCH 31.8 (H) pg      MCHC 31.9 g/dL      RDW 14.7 (H) %      RDW-SD 54.4 (H) fl      MPV 11.3 fL      Platelets 137 10*3/mm3      Neutrophil % 71.9 %      Lymphocyte % 16.7 %      Monocyte % 4.6 %      Eosinophil % 6.2 %      Basophil % 0.3 %      Immature Grans % 0.3 %      Neutrophils, Absolute 6.39 10*3/mm3      Lymphocytes, Absolute 1.49 10*3/mm3      Monocytes, Absolute 0.41 10*3/mm3      Eosinophils, Absolute 0.55 10*3/mm3      Basophils, Absolute 0.03 10*3/mm3      Immature Grans, Absolute 0.03 10*3/mm3      nRBC 0.0 /100 WBC     Magnesium [[id number]]  (Normal) Collected:  01/26/18 0624    Specimen:  Blood Updated:  01/26/18 0708     Magnesium 1.9 mg/dL     Basic Metabolic Panel  [[id number]]  (Abnormal) Collected:  01/26/18 0624    Specimen:  Blood Updated:  01/26/18 0708     Glucose 88 mg/dL      BUN 14 mg/dL      Creatinine 0.70 mg/dL      Sodium 142 mmol/L      Potassium 3.6 mmol/L      Chloride 108 (H) mmol/L      CO2 31.0 (H) mmol/L      Calcium 9.7 mg/dL      eGFR Non African Amer 80 mL/min/1.73      BUN/Creatinine Ratio 20.0     Anion Gap 6.6 mmol/L     Narrative:       The MDRD GFR formula is only valid for adults with stable renal function between ages 18 and 70.          Pertinent Radiology Results:    Imaging Results (all)     Procedure Component Value Units Date/Time    XR Chest 1 View [[id number]] Collected:  01/24/18 0757     Updated:  01/24/18 0800    Narrative:       PROCEDURE: XR CHEST 1 VW-     HISTORY: hypoxia/RLL pneumonia     COMPARISON: None.     FINDINGS: A left subclavian pacemaker is in place. The heart is normal  in size. The mediastinum is unremarkable. The patient is status post  median sternotomy and CABG procedure..  There are increased interstitial  markings which are felt to be chronic. There our low lung volumes with a  right basilar opacity which may reflect atelectasis or pneumonia. There  is no pneumothorax. There are no acute osseous abnormalities.           Impression:       Low lung volumes with a right basilar opacity which may  reflect atelectasis or pneumonia.        This report was finalized on 1/24/2018 7:58 AM by Court Mcclelland M.D..    XR Abdomen KUB [[id number]] Collected:  01/24/18 0756     Updated:  01/24/18 0824    Narrative:       PROCEDURE: XR ABDOMEN KUB-     HISTORY: pre-procedure; N20.1-Calculus of ureter; A41.9-Sepsis,  unspecified organism; N39.0-Urinary tract infection, site not specified     COMPARISON: None.     FINDINGS: An AP view of the abdomen and pelvis demonstrates an  unremarkable bowel gas pattern with no evidence of obstruction. There is  a 5 mm calculus in the right hemipelvis which is nonspecific.           Impression:        Nonspecific 5 mm calcification in the right hemipelvis which  in the correct clinical setting could reflect a distal ureteral stone.             This report was finalized on 1/24/2018 7:57 AM by Court Mcclelland M.D..          Condition on Discharge:  Stable        Code status during the hospital stay:        Discharge Disposition:    Home-Health Care [facility]    Discharge Medication:     Matthew Fishman   Home Medication Instructions JOSÉ LUIS:[id number]    Printed on:01/26/18 1241   Medication Information                      aspirin 81 MG EC tablet  Take 81 mg by mouth Daily.             dabigatran etexilate (PRADAXA) 75 MG capsule  Take 75 mg by mouth 2 (Two) Times a Day.             docusate sodium (COLACE) 100 MG capsule  Take 100 mg by mouth 2 (Two) Times a Day As Needed for Constipation.             furosemide (LASIX) 20 MG tablet  Take 20 mg by mouth Every Other Day.             furosemide (LASIX) 40 MG tablet  Take 40 mg by mouth Every Other Day.             isosorbide mononitrate (IMDUR) 30 MG 24 hr tablet  Take 30 mg by mouth 2 (Two) Times a Day.             metoprolol tartrate (LOPRESSOR) 50 MG tablet  Take 75 mg by mouth 2 (Two) Times a Day.             Multiple Vitamins-Minerals (CENTRUM SILVER 50+WOMEN PO)  Take 1 tablet by mouth Daily.             nitrofurantoin (MACRODANTIN) 100 MG capsule  Take 1 capsule by mouth 2 (Two) Times a Day With Meals for 7 days.             potassium chloride (K-DUR) 10 MEQ CR tablet  Take 10 mEq by mouth Daily.             pravastatin (PRAVACHOL) 20 MG tablet  Take 20 mg by mouth Daily.             ranolazine (RANEXA) 500 MG 12 hr tablet  Take 500 mg by mouth 2 (Two) Times a Day.             tolterodine LA (DETROL LA) 4 MG 24 hr capsule  Take 4 mg by mouth Daily.                 Discharge Diet:     Diet Instructions     Diet: Cardiac; Thin       Discharge Diet:  Cardiac   Fluid Consistency:  Thin                 Activity at Discharge:     Activity Instructions      Activity as Tolerated                     Follow-up Appointments:    No future appointments.  Additional Instructions for the Follow-ups that You Need to Schedule     Ambulatory Referral to Home Health    As directed    Face to Face Visit Date:  1/26/2018    Follow-up Provider for Plan of Care?:  I treated the patient in an acute care facility and will not continue treatment after discharge.    Follow-up Provider:  TRUNG TEJEDA [[id number]]    Reason/Clinical Findings:  WEAKNESS, SEPSIS, UTI    Describe mobility limitations that make leaving home difficult:  ABNORMAL GAIT, HOME BOUND    Nursing/Therapeutic Services Requested:  Skilled Nursing Physical Therapy Occupational Therapy    Skilled nursing orders:  CHF management Cardiopulmonary assessments O2 instruction    PT orders:  Home safety assessment Therapeutic exercise    Occupational orders:  Activities of daily living Strengthening Home safety assessment    Frequency:  1 Week 1           Discharge Follow-up with PCP    As directed    Follow Up Details:  ONE WEEK           Discharge Follow-up with Specialty: DR LARA NEXT WEEK; 1 Week    As directed    Specialty:  DR LARA NEXT WEEK    Follow Up:  1 Week                     Test Results Pending at Discharge:     Order Current Status    Stone Analysis - Calculus, Kidney, Right In process    Blood Culture With CARMEN - Blood, Preliminary result             Alicia Shannon DO  01/26/18  12:41 PM      Medication risks and benefits were discussed in great detail. The patient reported being satisfied with the current treatment plan and the care delivered while hospitalized.     Time:  I spent 39 minutes preparing discharge counseling and teaching.

## 2018-01-26 NOTE — PLAN OF CARE
Problem: Infection, Risk/Actual (Adult)  Goal: Infection Prevention/Resolution   01/26/18 0223   Infection, Risk/Actual (Adult)   Infection Prevention/Resolution making progress toward outcome

## 2018-01-29 ENCOUNTER — OFFICE VISIT (OUTPATIENT)
Dept: UROLOGY | Facility: CLINIC | Age: 83
End: 2018-01-29

## 2018-01-29 VITALS
RESPIRATION RATE: 18 BRPM | HEART RATE: 63 BPM | SYSTOLIC BLOOD PRESSURE: 112 MMHG | OXYGEN SATURATION: 97 % | TEMPERATURE: 98 F | DIASTOLIC BLOOD PRESSURE: 67 MMHG

## 2018-01-29 DIAGNOSIS — N30.01 ACUTE CYSTITIS WITH HEMATURIA: ICD-10-CM

## 2018-01-29 DIAGNOSIS — R31.0 GROSS HEMATURIA: ICD-10-CM

## 2018-01-29 DIAGNOSIS — N20.0 KIDNEY STONES: Primary | ICD-10-CM

## 2018-01-29 LAB
BACTERIA SPEC AEROBE CULT: NORMAL
BILIRUB BLD-MCNC: ABNORMAL MG/DL
CLARITY, POC: ABNORMAL
COLOR UR: ABNORMAL
GLUCOSE UR STRIP-MCNC: NEGATIVE MG/DL
KETONES UR QL: ABNORMAL
LEUKOCYTE EST, POC: ABNORMAL
NITRITE UR-MCNC: POSITIVE MG/ML
PH UR: 7.5 [PH] (ref 5–8)
PROT UR STRIP-MCNC: ABNORMAL MG/DL
RBC # UR STRIP: ABNORMAL /UL
SP GR UR: 1.02 (ref 1–1.03)
UROBILINOGEN UR QL: ABNORMAL

## 2018-01-29 PROCEDURE — 99213 OFFICE O/P EST LOW 20 MIN: CPT | Performed by: UROLOGY

## 2018-01-29 PROCEDURE — 81003 URINALYSIS AUTO W/O SCOPE: CPT | Performed by: UROLOGY

## 2018-01-29 RX ORDER — FLUCONAZOLE 150 MG/1
150 TABLET ORAL DAILY
Qty: 3 TABLET | Refills: 0 | Status: ON HOLD | OUTPATIENT
Start: 2018-01-29 | End: 2020-08-13

## 2018-01-29 NOTE — PROGRESS NOTES
Chief Complaint  Hospital follow up (Patient is here for hospital fup, ureteroscopy laser litho with insertion of a stent.)        NASIM Fishman is a 82 y.o. female who returns today for follow-up after ureteroscopy and laser lithotripsy for ureteral calculus followed by insertion of the stent.  She returns today to have it removed.  She was evaluated originally in the [facility] emergency room for nausea chills fever and abdominal pain.  She was transferred here with a tentative diagnosis of urosepsis from the obstructing calculus.  Her urine cultures from [facility] and here both returned negative however.  Its thought this may have been a false positive based on a voided urine sample.  She was found also to have right lower lobe pneumonia or at least significant atelectasis on chest x-ray upon admission.  She has no history of kidney stones but takes Pradaxa for chronic atrial fib and I think has chronic blood in the urine based on her kidney stones and blood thinner.  She was discharged home from the hospital on Macrobid to cover the stent.    Vitals:    01/29/18 1347   BP: 112/67   Pulse: 63   Resp: 18   Temp: 98 °F (36.7 °C)   SpO2: 97%       Past Medical History  Past Medical History:   Diagnosis Date   • Arthritis    • Atrial fibrillation, chronic    • CHF (congestive heart failure)    • High cholesterol    • History of MI (myocardial infarction)    • HTN (hypertension)    • Hx of CABG 1989    triple   • Hx of cholecystectomy    • Pacemaker    • Previous back surgery        Past Surgical History  Past Surgical History:   Procedure Laterality Date   • BACK SURGERY     • CHOLECYSTECTOMY     • CORONARY ARTERY BYPASS GRAFT      x3   • URETEROSCOPY LASER LITHOTRIPSY WITH STENT INSERTION N/A 1/24/2018    Procedure: URETEROSCOPY LASER LITHOTRIPSY WITH STENT INSERTION;  Surgeon: Camron Taveras MD;  Location: [facility];  Service:        Medications  has a current medication list which includes the following prescription(s):  aspirin, dabigatran etexilate, docusate sodium, furosemide, furosemide, isosorbide mononitrate, metoprolol tartrate, multiple vitamins-minerals, nitrofurantoin, potassium chloride, pravastatin, ranolazine, and tolterodine la.      Allergies  Allergies   Allergen Reactions   • Peanut-Containing Drug Products Anaphylaxis   • Codeine    • Corn-Containing Products GI Intolerance   • Tomato GI Intolerance       Social History  Social History     Social History Narrative       Family History  She has no family history of bladder or kidney cancer  She has no family history of kidney stones      AUA Symptom Score:      Review of Systems  Review of Systems    Physical Exam  Physical Exam   Constitutional: She is oriented to person, place, and time. She appears well-developed and well-nourished.   HENT:   Head: Normocephalic.   Eyes: EOM are normal. Pupils are equal, round, and reactive to light.   Neck: Normal range of motion. Neck supple.   Cardiovascular: Normal rate, regular rhythm and normal heart sounds.    Pulmonary/Chest: Effort normal.   Abdominal: Soft. Bowel sounds are normal.   Neurological: She is alert and oriented to person, place, and time.   Skin: Skin is warm and dry.   Psychiatric: She has a normal mood and affect.       Labs Recent and today in the office:  Results for orders placed or performed in visit on 01/29/18   POC Urinalysis Dipstick, Automated   Result Value Ref Range    Color Red (A) Yellow, Straw, Dark Yellow, Kimberly    Clarity, UA Turbid (A) Clear    Glucose, UA Negative Negative, 1000 mg/dL (3+) mg/dL    Bilirubin Small (1+) (A) Negative    Ketones, UA 1+ (A) Negative    Specific Gravity  1.020 1.005 - 1.030    Blood, UA 2+ (A) Negative    pH, Urine 7.5 5.0 - 8.0    Protein, POC 3+ (A) Negative mg/dL    Urobilinogen, UA 4 E.U./dL  (A) Normal    Leukocytes Large (3+) (A) Negative    Nitrite, UA Positive (A) Negative         Assessment & Plan  Ureteral calculus: Her stent is removed with the  protruding suture.    Urinary tract infection/gross hematuria: A culture is submitted on a catheterized specimen that is grossly bloody and appears infected.  She's already on Macrodantin in the last culture was no growth so I suspect a yeast infection and Diflucan is prescribed.

## 2018-01-31 LAB
BACTERIA UR CULT: NO GROWTH
BACTERIA UR CULT: NORMAL

## 2018-02-01 LAB
CA PHOS CRY STONE QL IR: 10 %
COD CRY STONE QL IR: 25 %
COLOR STONE: NORMAL
COM CRY STONE QL IR: 65 %
COMPN STONE: NORMAL
CONV COMMENT: NORMAL
Lab: NORMAL
Lab: NORMAL
NIDUS STONE QL: NORMAL
PATH REPORT.COMMENTS IMP SPEC: NORMAL
SIZE STONE: NORMAL MM
WT STONE: 31.3 MG

## 2018-02-12 ENCOUNTER — OFFICE VISIT (OUTPATIENT)
Dept: UROLOGY | Facility: CLINIC | Age: 83
End: 2018-02-12

## 2018-02-12 VITALS — WEIGHT: 179 LBS | OXYGEN SATURATION: 97 % | BODY MASS INDEX: 35.14 KG/M2 | HEIGHT: 60 IN | HEART RATE: 69 BPM

## 2018-02-12 DIAGNOSIS — N30.01 ACUTE CYSTITIS WITH HEMATURIA: ICD-10-CM

## 2018-02-12 DIAGNOSIS — N20.0 NEPHROLITHIASIS: Primary | ICD-10-CM

## 2018-02-12 PROCEDURE — 99213 OFFICE O/P EST LOW 20 MIN: CPT | Performed by: UROLOGY

## 2018-02-12 NOTE — PROGRESS NOTES
Chief Complaint  Kidney Stones (Patient is here for a 2 week follow up )      NASIM Fishman is a 82 y.o.female who returns today for follow-up 2 weeks after laser lithotripsy of a ureteral calculus associated with urosepsis.  Her follow-up urine culture was no growth and she returns today without complaint.  Unfortunately she is unable to void.    Vitals:    02/12/18 1344   Pulse: 69   SpO2: 97%       Past Medical History  Past Medical History:   Diagnosis Date   • Arthritis    • Atrial fibrillation, chronic    • CHF (congestive heart failure)    • High cholesterol    • History of MI (myocardial infarction)    • HTN (hypertension)    • Hx of CABG 1989    triple   • Hx of cholecystectomy    • Pacemaker    • Previous back surgery        Past Surgical History  Past Surgical History:   Procedure Laterality Date   • BACK SURGERY     • CHOLECYSTECTOMY     • CORONARY ARTERY BYPASS GRAFT      x3   • URETEROSCOPY LASER LITHOTRIPSY WITH STENT INSERTION N/A 1/24/2018    Procedure: URETEROSCOPY LASER LITHOTRIPSY WITH STENT INSERTION;  Surgeon: Camron Taveras MD;  Location[facility];  Service:        Medications  has a current medication list which includes the following prescription(s): aspirin, dabigatran etexilate, docusate sodium, fluconazole, furosemide, furosemide, isosorbide mononitrate, metoprolol tartrate, multiple vitamins-minerals, potassium chloride, pravastatin, ranolazine, and tolterodine la.    Allergies  Allergies   Allergen Reactions   • Peanut-Containing Drug Products Anaphylaxis   • Codeine    • Corn-Containing Products GI Intolerance   • Tomato GI Intolerance       Social History  Social History     Social History   • Marital status:      Spouse name: N/A   • Number of children: N/A   • Years of education: N/A     Occupational History   • Not on file.     Social History Main Topics   • Smoking status: Never Smoker   • Smokeless tobacco: Never Used   • Alcohol use No   • Drug use: No   • Sexual  activity: Defer     Other Topics Concern   • Not on file     Social History Narrative       Family History  History reviewed. No pertinent family history.    Review of Systems  Review of Systems    Physical Exam  Physical Exam    Labs recent and today in the office:  Results for orders placed or performed in visit on 01/29/18   Urine Culture - Urine, Urine, Catheter   Result Value Ref Range    Urine Culture Final report     Result 1 No growth    POC Urinalysis Dipstick, Automated   Result Value Ref Range    Color Red (A) Yellow, Straw, Dark Yellow, Kimberly    Clarity, UA Turbid (A) Clear    Glucose, UA Negative Negative, 1000 mg/dL (3+) mg/dL    Bilirubin Small (1+) (A) Negative    Ketones, UA 1+ (A) Negative    Specific Gravity  1.020 1.005 - 1.030    Blood, UA 2+ (A) Negative    pH, Urine 7.5 5.0 - 8.0    Protein, POC 3+ (A) Negative mg/dL    Urobilinogen, UA 4 E.U./dL  (A) Normal    Leukocytes Large (3+) (A) Negative    Nitrite, UA Positive (A) Negative         Assessment & Plan  #1 ureteral calculus/urinary tract infection: Greater than 15 minutes is spent with the patient and her family explaining the proper diet to avoid future stones.  They volunteered that she seldom drinks water.  She can return on an annual basis and when necessary.

## 2020-08-13 ENCOUNTER — HOSPITAL ENCOUNTER (INPATIENT)
Facility: HOSPITAL | Age: 85
LOS: 4 days | Discharge: HOME HEALTH | End: 2020-08-17
Attending: FAMILY MEDICINE | Admitting: INTERNAL MEDICINE

## 2020-08-13 ENCOUNTER — APPOINTMENT (OUTPATIENT)
Dept: GENERAL RADIOLOGY | Facility: HOSPITAL | Age: 85
End: 2020-08-13

## 2020-08-13 ENCOUNTER — APPOINTMENT (OUTPATIENT)
Dept: CT IMAGING | Facility: HOSPITAL | Age: 85
End: 2020-08-13

## 2020-08-13 DIAGNOSIS — A41.9 SEPSIS, DUE TO UNSPECIFIED ORGANISM, UNSPECIFIED WHETHER ACUTE ORGAN DYSFUNCTION PRESENT: Primary | ICD-10-CM

## 2020-08-13 DIAGNOSIS — I48.20 CHRONIC ATRIAL FIBRILLATION: Chronic | ICD-10-CM

## 2020-08-13 DIAGNOSIS — N30.90 CYSTITIS: ICD-10-CM

## 2020-08-13 DIAGNOSIS — R53.1 WEAKNESS: ICD-10-CM

## 2020-08-13 LAB
ALBUMIN SERPL-MCNC: 4.29 G/DL (ref 3.5–5.2)
ALBUMIN/GLOB SERPL: 1 G/DL
ALP SERPL-CCNC: 79 U/L (ref 39–117)
ALT SERPL W P-5'-P-CCNC: 24 U/L (ref 1–33)
ANION GAP SERPL CALCULATED.3IONS-SCNC: 16.1 MMOL/L (ref 5–15)
APTT PPP: 82.6 SECONDS (ref 25.6–35.3)
AST SERPL-CCNC: 36 U/L (ref 1–32)
BASOPHILS # BLD AUTO: 0.05 10*3/MM3 (ref 0–0.2)
BASOPHILS NFR BLD AUTO: 0.3 % (ref 0–1.5)
BILIRUB SERPL-MCNC: 1.3 MG/DL (ref 0–1.2)
BUN SERPL-MCNC: 61 MG/DL (ref 8–23)
BUN/CREAT SERPL: 38.6 (ref 7–25)
CALCIUM SPEC-SCNC: 13.5 MG/DL (ref 8.6–10.5)
CHLORIDE SERPL-SCNC: 86 MMOL/L (ref 98–107)
CO2 SERPL-SCNC: 37.9 MMOL/L (ref 22–29)
CREAT SERPL-MCNC: 1.58 MG/DL (ref 0.57–1)
CRP SERPL-MCNC: 0.42 MG/DL (ref 0–0.5)
DEPRECATED RDW RBC AUTO: 51.5 FL (ref 37–54)
EOSINOPHIL # BLD AUTO: 0.05 10*3/MM3 (ref 0–0.4)
EOSINOPHIL NFR BLD AUTO: 0.3 % (ref 0.3–6.2)
ERYTHROCYTE [DISTWIDTH] IN BLOOD BY AUTOMATED COUNT: 14.4 % (ref 12.3–15.4)
GFR SERPL CREATININE-BSD FRML MDRD: 31 ML/MIN/1.73
GLOBULIN UR ELPH-MCNC: 4.2 GM/DL
GLUCOSE SERPL-MCNC: 186 MG/DL (ref 65–99)
HCT VFR BLD AUTO: 53.5 % (ref 34–46.6)
HGB BLD-MCNC: 17.8 G/DL (ref 12–15.9)
IMM GRANULOCYTES # BLD AUTO: 0.06 10*3/MM3 (ref 0–0.05)
IMM GRANULOCYTES NFR BLD AUTO: 0.4 % (ref 0–0.5)
INR PPP: 1.86 (ref 0.9–1.1)
LYMPHOCYTES # BLD AUTO: 2.42 10*3/MM3 (ref 0.7–3.1)
LYMPHOCYTES NFR BLD AUTO: 15.4 % (ref 19.6–45.3)
MAGNESIUM SERPL-MCNC: 2.9 MG/DL (ref 1.6–2.4)
MCH RBC QN AUTO: 32.6 PG (ref 26.6–33)
MCHC RBC AUTO-ENTMCNC: 33.3 G/DL (ref 31.5–35.7)
MCV RBC AUTO: 98 FL (ref 79–97)
MONOCYTES # BLD AUTO: 0.84 10*3/MM3 (ref 0.1–0.9)
MONOCYTES NFR BLD AUTO: 5.3 % (ref 5–12)
NEUTROPHILS NFR BLD AUTO: 12.3 10*3/MM3 (ref 1.7–7)
NEUTROPHILS NFR BLD AUTO: 78.3 % (ref 42.7–76)
NRBC BLD AUTO-RTO: 0 /100 WBC (ref 0–0.2)
NT-PROBNP SERPL-MCNC: 1377 PG/ML (ref 0–1800)
PLATELET # BLD AUTO: 261 10*3/MM3 (ref 140–450)
PMV BLD AUTO: 11 FL (ref 6–12)
POTASSIUM SERPL-SCNC: 2.6 MMOL/L (ref 3.5–5.2)
PROT SERPL-MCNC: 8.5 G/DL (ref 6–8.5)
PROTHROMBIN TIME: 21.3 SECONDS (ref 11.9–14.1)
RBC # BLD AUTO: 5.46 10*6/MM3 (ref 3.77–5.28)
SODIUM SERPL-SCNC: 140 MMOL/L (ref 136–145)
TROPONIN T SERPL-MCNC: <0.01 NG/ML (ref 0–0.03)
WBC # BLD AUTO: 15.72 10*3/MM3 (ref 3.4–10.8)

## 2020-08-13 PROCEDURE — 99285 EMERGENCY DEPT VISIT HI MDM: CPT

## 2020-08-13 PROCEDURE — 70450 CT HEAD/BRAIN W/O DYE: CPT | Performed by: RADIOLOGY

## 2020-08-13 PROCEDURE — 93010 ELECTROCARDIOGRAM REPORT: CPT | Performed by: INTERNAL MEDICINE

## 2020-08-13 PROCEDURE — 86140 C-REACTIVE PROTEIN: CPT | Performed by: FAMILY MEDICINE

## 2020-08-13 PROCEDURE — 99223 1ST HOSP IP/OBS HIGH 75: CPT | Performed by: PHYSICIAN ASSISTANT

## 2020-08-13 PROCEDURE — 85610 PROTHROMBIN TIME: CPT | Performed by: FAMILY MEDICINE

## 2020-08-13 PROCEDURE — 85025 COMPLETE CBC W/AUTO DIFF WBC: CPT | Performed by: FAMILY MEDICINE

## 2020-08-13 PROCEDURE — 80053 COMPREHEN METABOLIC PANEL: CPT | Performed by: FAMILY MEDICINE

## 2020-08-13 PROCEDURE — 87186 SC STD MICRODIL/AGAR DIL: CPT | Performed by: FAMILY MEDICINE

## 2020-08-13 PROCEDURE — 25010000002 CEFTRIAXONE: Performed by: FAMILY MEDICINE

## 2020-08-13 PROCEDURE — 87086 URINE CULTURE/COLONY COUNT: CPT | Performed by: FAMILY MEDICINE

## 2020-08-13 PROCEDURE — P9612 CATHETERIZE FOR URINE SPEC: HCPCS

## 2020-08-13 PROCEDURE — 93005 ELECTROCARDIOGRAM TRACING: CPT | Performed by: FAMILY MEDICINE

## 2020-08-13 PROCEDURE — 71045 X-RAY EXAM CHEST 1 VIEW: CPT | Performed by: RADIOLOGY

## 2020-08-13 PROCEDURE — 99284 EMERGENCY DEPT VISIT MOD MDM: CPT

## 2020-08-13 PROCEDURE — 70450 CT HEAD/BRAIN W/O DYE: CPT

## 2020-08-13 PROCEDURE — 83880 ASSAY OF NATRIURETIC PEPTIDE: CPT | Performed by: FAMILY MEDICINE

## 2020-08-13 PROCEDURE — 84484 ASSAY OF TROPONIN QUANT: CPT | Performed by: FAMILY MEDICINE

## 2020-08-13 PROCEDURE — 85730 THROMBOPLASTIN TIME PARTIAL: CPT | Performed by: FAMILY MEDICINE

## 2020-08-13 PROCEDURE — 81001 URINALYSIS AUTO W/SCOPE: CPT | Performed by: FAMILY MEDICINE

## 2020-08-13 PROCEDURE — 74176 CT ABD & PELVIS W/O CONTRAST: CPT

## 2020-08-13 PROCEDURE — 87088 URINE BACTERIA CULTURE: CPT | Performed by: FAMILY MEDICINE

## 2020-08-13 PROCEDURE — 25010000003 POTASSIUM CHLORIDE 10 MEQ/100ML SOLUTION: Performed by: FAMILY MEDICINE

## 2020-08-13 PROCEDURE — 71045 X-RAY EXAM CHEST 1 VIEW: CPT

## 2020-08-13 PROCEDURE — 83735 ASSAY OF MAGNESIUM: CPT | Performed by: FAMILY MEDICINE

## 2020-08-13 RX ORDER — POTASSIUM CHLORIDE 20 MEQ/1
40 TABLET, EXTENDED RELEASE ORAL ONCE
Status: COMPLETED | OUTPATIENT
Start: 2020-08-13 | End: 2020-08-13

## 2020-08-13 RX ORDER — FUROSEMIDE 20 MG/1
20 TABLET ORAL NIGHTLY
Status: CANCELLED | OUTPATIENT
Start: 2020-08-14

## 2020-08-13 RX ORDER — POTASSIUM CHLORIDE 7.45 MG/ML
10 INJECTION INTRAVENOUS ONCE
Status: COMPLETED | OUTPATIENT
Start: 2020-08-13 | End: 2020-08-13

## 2020-08-13 RX ORDER — FUROSEMIDE 40 MG/1
40 TABLET ORAL EVERY MORNING
Status: CANCELLED | OUTPATIENT
Start: 2020-08-14

## 2020-08-13 RX ORDER — POTASSIUM CHLORIDE 7.45 MG/ML
10 INJECTION INTRAVENOUS ONCE
Status: DISCONTINUED | OUTPATIENT
Start: 2020-08-13 | End: 2020-08-13

## 2020-08-13 RX ORDER — SODIUM CHLORIDE 0.9 % (FLUSH) 0.9 %
10 SYRINGE (ML) INJECTION EVERY 12 HOURS SCHEDULED
Status: DISCONTINUED | OUTPATIENT
Start: 2020-08-13 | End: 2020-08-17 | Stop reason: HOSPADM

## 2020-08-13 RX ORDER — FUROSEMIDE 20 MG/1
20 TABLET ORAL NIGHTLY
COMMUNITY
End: 2020-08-17 | Stop reason: HOSPADM

## 2020-08-13 RX ORDER — SODIUM CHLORIDE 0.9 % (FLUSH) 0.9 %
10 SYRINGE (ML) INJECTION AS NEEDED
Status: DISCONTINUED | OUTPATIENT
Start: 2020-08-13 | End: 2020-08-17 | Stop reason: HOSPADM

## 2020-08-13 RX ORDER — OXYBUTYNIN CHLORIDE 5 MG/1
10 TABLET, EXTENDED RELEASE ORAL DAILY
Status: CANCELLED | OUTPATIENT
Start: 2020-08-14

## 2020-08-13 RX ORDER — POTASSIUM CHLORIDE 7.45 MG/ML
10 INJECTION INTRAVENOUS
Status: DISCONTINUED | OUTPATIENT
Start: 2020-08-13 | End: 2020-08-17 | Stop reason: HOSPADM

## 2020-08-13 RX ORDER — NITROGLYCERIN 0.4 MG/1
0.4 TABLET SUBLINGUAL
Status: CANCELLED | OUTPATIENT
Start: 2020-08-13

## 2020-08-13 RX ORDER — POTASSIUM CHLORIDE 20 MEQ/1
20 TABLET, EXTENDED RELEASE ORAL 2 TIMES DAILY
Status: CANCELLED | OUTPATIENT
Start: 2020-08-14

## 2020-08-13 RX ORDER — POTASSIUM CHLORIDE 1.5 G/1.77G
40 POWDER, FOR SOLUTION ORAL AS NEEDED
Status: DISCONTINUED | OUTPATIENT
Start: 2020-08-13 | End: 2020-08-17 | Stop reason: HOSPADM

## 2020-08-13 RX ORDER — NITROGLYCERIN 0.4 MG/1
0.4 TABLET SUBLINGUAL
COMMUNITY

## 2020-08-13 RX ORDER — HEPARIN SODIUM 5000 [USP'U]/ML
5000 INJECTION, SOLUTION INTRAVENOUS; SUBCUTANEOUS EVERY 12 HOURS SCHEDULED
Status: DISCONTINUED | OUTPATIENT
Start: 2020-08-13 | End: 2020-08-14

## 2020-08-13 RX ORDER — POTASSIUM CHLORIDE 750 MG/1
40 CAPSULE, EXTENDED RELEASE ORAL AS NEEDED
Status: DISCONTINUED | OUTPATIENT
Start: 2020-08-13 | End: 2020-08-17 | Stop reason: HOSPADM

## 2020-08-13 RX ORDER — NITROGLYCERIN 0.4 MG/1
0.4 TABLET SUBLINGUAL
Status: DISCONTINUED | OUTPATIENT
Start: 2020-08-13 | End: 2020-08-17 | Stop reason: HOSPADM

## 2020-08-13 RX ORDER — POTASSIUM CHLORIDE 20 MEQ/1
20 TABLET, EXTENDED RELEASE ORAL 2 TIMES DAILY
COMMUNITY
End: 2020-08-17 | Stop reason: HOSPADM

## 2020-08-13 RX ORDER — POTASSIUM CHLORIDE 7.45 MG/ML
10 INJECTION INTRAVENOUS ONCE
Status: DISCONTINUED | OUTPATIENT
Start: 2020-08-13 | End: 2020-08-16

## 2020-08-13 RX ORDER — RANOLAZINE 500 MG/1
500 TABLET, EXTENDED RELEASE ORAL 2 TIMES DAILY
Status: CANCELLED | OUTPATIENT
Start: 2020-08-14

## 2020-08-13 RX ADMIN — POTASSIUM CHLORIDE 10 MEQ: 10 INJECTION, SOLUTION INTRAVENOUS at 19:30

## 2020-08-13 RX ADMIN — SODIUM CHLORIDE 1000 ML: 9 INJECTION, SOLUTION INTRAVENOUS at 17:30

## 2020-08-13 RX ADMIN — POTASSIUM CHLORIDE 10 MEQ: 7.46 INJECTION, SOLUTION INTRAVENOUS at 18:22

## 2020-08-13 RX ADMIN — SODIUM CHLORIDE 500 ML: 9 INJECTION, SOLUTION INTRAVENOUS at 17:30

## 2020-08-13 RX ADMIN — POTASSIUM CHLORIDE 10 MEQ: 10 INJECTION, SOLUTION INTRAVENOUS at 20:59

## 2020-08-13 RX ADMIN — CEFTRIAXONE 1 G: 1 INJECTION, POWDER, FOR SOLUTION INTRAMUSCULAR; INTRAVENOUS at 22:21

## 2020-08-13 RX ADMIN — POTASSIUM CHLORIDE 40 MEQ: 1500 TABLET, EXTENDED RELEASE ORAL at 18:09

## 2020-08-14 PROBLEM — Z92.89: Chronic | Status: ACTIVE | Noted: 2020-08-14

## 2020-08-14 PROBLEM — J90 PLEURAL EFFUSION ON RIGHT: Status: RESOLVED | Noted: 2018-01-24 | Resolved: 2020-08-14

## 2020-08-14 PROBLEM — I10 ESSENTIAL HYPERTENSION: Chronic | Status: ACTIVE | Noted: 2020-08-14

## 2020-08-14 PROBLEM — N39.0 SEPSIS SECONDARY TO UTI (HCC): Status: RESOLVED | Noted: 2018-01-24 | Resolved: 2020-08-14

## 2020-08-14 PROBLEM — A41.9 SEPSIS SECONDARY TO UTI: Status: RESOLVED | Noted: 2018-01-24 | Resolved: 2020-08-14

## 2020-08-14 PROBLEM — N20.1 URETEROLITHIASIS: Status: RESOLVED | Noted: 2018-01-24 | Resolved: 2020-08-14

## 2020-08-14 PROBLEM — E66.9 OBESITY (BMI 30-39.9): Chronic | Status: ACTIVE | Noted: 2020-08-14

## 2020-08-14 PROBLEM — Z86.19 HISTORY OF CLOSTRIDIOIDES DIFFICILE COLITIS: Chronic | Status: ACTIVE | Noted: 2020-08-14

## 2020-08-14 PROBLEM — Z95.1 HX OF CABG: Chronic | Status: ACTIVE | Noted: 2020-08-14

## 2020-08-14 PROBLEM — N20.1 URETERAL CALCULUS, RIGHT: Status: RESOLVED | Noted: 2018-01-23 | Resolved: 2020-08-14

## 2020-08-14 PROBLEM — J96.01 ACUTE RESPIRATORY FAILURE WITH HYPOXIA (HCC): Status: RESOLVED | Noted: 2018-01-24 | Resolved: 2020-08-14

## 2020-08-14 PROBLEM — I25.10 ATHEROSCLEROSIS OF NATIVE CORONARY ARTERY OF NATIVE HEART WITHOUT ANGINA PECTORIS: Chronic | Status: RESOLVED | Noted: 2018-01-24 | Resolved: 2020-08-14

## 2020-08-14 PROBLEM — E78.5 HYPERLIPIDEMIA: Chronic | Status: ACTIVE | Noted: 2020-08-14

## 2020-08-14 LAB
ALBUMIN SERPL-MCNC: 3.42 G/DL (ref 3.5–5.2)
ALBUMIN/GLOB SERPL: 0.9 G/DL
ALP SERPL-CCNC: 66 U/L (ref 39–117)
ALT SERPL W P-5'-P-CCNC: 21 U/L (ref 1–33)
ANION GAP SERPL CALCULATED.3IONS-SCNC: 13.2 MMOL/L (ref 5–15)
AST SERPL-CCNC: 48 U/L (ref 1–32)
BACTERIA UR QL AUTO: ABNORMAL /HPF
BASOPHILS # BLD AUTO: 0.04 10*3/MM3 (ref 0–0.2)
BASOPHILS NFR BLD AUTO: 0.2 % (ref 0–1.5)
BILIRUB SERPL-MCNC: 1 MG/DL (ref 0–1.2)
BILIRUB UR QL STRIP: NEGATIVE
BUN SERPL-MCNC: 49 MG/DL (ref 8–23)
BUN/CREAT SERPL: 40.8 (ref 7–25)
CALCIUM SPEC-SCNC: 11.7 MG/DL (ref 8.6–10.5)
CALCIUM SPEC-SCNC: 11.9 MG/DL (ref 8.6–10.5)
CHLORIDE SERPL-SCNC: 94 MMOL/L (ref 98–107)
CLARITY UR: CLEAR
CO2 SERPL-SCNC: 29.8 MMOL/L (ref 22–29)
COLOR UR: YELLOW
CREAT SERPL-MCNC: 1.2 MG/DL (ref 0.57–1)
D-LACTATE SERPL-SCNC: 1.6 MMOL/L (ref 0.5–2)
DEPRECATED RDW RBC AUTO: 51.8 FL (ref 37–54)
EOSINOPHIL # BLD AUTO: 0.21 10*3/MM3 (ref 0–0.4)
EOSINOPHIL NFR BLD AUTO: 1.2 % (ref 0.3–6.2)
ERYTHROCYTE [DISTWIDTH] IN BLOOD BY AUTOMATED COUNT: 14.4 % (ref 12.3–15.4)
FOLATE SERPL-MCNC: >20 NG/ML (ref 4.78–24.2)
GFR SERPL CREATININE-BSD FRML MDRD: 43 ML/MIN/1.73
GLOBULIN UR ELPH-MCNC: 3.9 GM/DL
GLUCOSE SERPL-MCNC: 103 MG/DL (ref 65–99)
GLUCOSE UR STRIP-MCNC: NEGATIVE MG/DL
HBA1C MFR BLD: 5.4 % (ref 4.8–5.6)
HCT VFR BLD AUTO: 45.5 % (ref 34–46.6)
HGB BLD-MCNC: 15.5 G/DL (ref 12–15.9)
HGB UR QL STRIP.AUTO: ABNORMAL
HYALINE CASTS UR QL AUTO: ABNORMAL /LPF
IMM GRANULOCYTES # BLD AUTO: 0.07 10*3/MM3 (ref 0–0.05)
IMM GRANULOCYTES NFR BLD AUTO: 0.4 % (ref 0–0.5)
KETONES UR QL STRIP: NEGATIVE
LEUKOCYTE ESTERASE UR QL STRIP.AUTO: ABNORMAL
LYMPHOCYTES # BLD AUTO: 3.59 10*3/MM3 (ref 0.7–3.1)
LYMPHOCYTES NFR BLD AUTO: 20.9 % (ref 19.6–45.3)
MAGNESIUM SERPL-MCNC: 2.2 MG/DL (ref 1.6–2.4)
MCH RBC QN AUTO: 33.2 PG (ref 26.6–33)
MCHC RBC AUTO-ENTMCNC: 34.1 G/DL (ref 31.5–35.7)
MCV RBC AUTO: 97.4 FL (ref 79–97)
MONOCYTES # BLD AUTO: 1.04 10*3/MM3 (ref 0.1–0.9)
MONOCYTES NFR BLD AUTO: 6.1 % (ref 5–12)
NEUTROPHILS NFR BLD AUTO: 12.2 10*3/MM3 (ref 1.7–7)
NEUTROPHILS NFR BLD AUTO: 71.2 % (ref 42.7–76)
NITRITE UR QL STRIP: POSITIVE
NRBC BLD AUTO-RTO: 0 /100 WBC (ref 0–0.2)
PH UR STRIP.AUTO: 6 [PH] (ref 5–8)
PHOSPHATE SERPL-MCNC: 1.7 MG/DL (ref 2.5–4.5)
PHOSPHATE SERPL-MCNC: 1.7 MG/DL (ref 2.5–4.5)
PLATELET # BLD AUTO: 194 10*3/MM3 (ref 140–450)
PMV BLD AUTO: 11.3 FL (ref 6–12)
POTASSIUM SERPL-SCNC: 3.5 MMOL/L (ref 3.5–5.2)
POTASSIUM SERPL-SCNC: 4.1 MMOL/L (ref 3.5–5.2)
PROT SERPL-MCNC: 7.3 G/DL (ref 6–8.5)
PROT UR QL STRIP: NEGATIVE
PTH-INTACT SERPL-MCNC: 32.3 PG/ML (ref 15–65)
RBC # BLD AUTO: 4.67 10*6/MM3 (ref 3.77–5.28)
RBC # UR: ABNORMAL /HPF
REF LAB TEST METHOD: ABNORMAL
SODIUM SERPL-SCNC: 137 MMOL/L (ref 136–145)
SP GR UR STRIP: 1.01 (ref 1–1.03)
SQUAMOUS #/AREA URNS HPF: ABNORMAL /HPF
TSH SERPL DL<=0.05 MIU/L-ACNC: 3.01 UIU/ML (ref 0.27–4.2)
UROBILINOGEN UR QL STRIP: ABNORMAL
VIT B12 BLD-MCNC: 1006 PG/ML (ref 211–946)
WBC # BLD AUTO: 17.15 10*3/MM3 (ref 3.4–10.8)
WBC UR QL AUTO: ABNORMAL /HPF

## 2020-08-14 PROCEDURE — 97116 GAIT TRAINING THERAPY: CPT

## 2020-08-14 PROCEDURE — 93005 ELECTROCARDIOGRAM TRACING: CPT | Performed by: INTERNAL MEDICINE

## 2020-08-14 PROCEDURE — 83036 HEMOGLOBIN GLYCOSYLATED A1C: CPT | Performed by: PHYSICIAN ASSISTANT

## 2020-08-14 PROCEDURE — 84443 ASSAY THYROID STIM HORMONE: CPT | Performed by: PHYSICIAN ASSISTANT

## 2020-08-14 PROCEDURE — 82746 ASSAY OF FOLIC ACID SERUM: CPT | Performed by: PHYSICIAN ASSISTANT

## 2020-08-14 PROCEDURE — 93010 ELECTROCARDIOGRAM REPORT: CPT | Performed by: SPECIALIST

## 2020-08-14 PROCEDURE — 82607 VITAMIN B-12: CPT | Performed by: PHYSICIAN ASSISTANT

## 2020-08-14 PROCEDURE — 87040 BLOOD CULTURE FOR BACTERIA: CPT | Performed by: PHYSICIAN ASSISTANT

## 2020-08-14 PROCEDURE — 85025 COMPLETE CBC W/AUTO DIFF WBC: CPT | Performed by: INTERNAL MEDICINE

## 2020-08-14 PROCEDURE — 84100 ASSAY OF PHOSPHORUS: CPT | Performed by: PHYSICIAN ASSISTANT

## 2020-08-14 PROCEDURE — 94799 UNLISTED PULMONARY SVC/PX: CPT

## 2020-08-14 PROCEDURE — 83735 ASSAY OF MAGNESIUM: CPT | Performed by: PHYSICIAN ASSISTANT

## 2020-08-14 PROCEDURE — 82310 ASSAY OF CALCIUM: CPT | Performed by: PHYSICIAN ASSISTANT

## 2020-08-14 PROCEDURE — 97166 OT EVAL MOD COMPLEX 45 MIN: CPT

## 2020-08-14 PROCEDURE — 83605 ASSAY OF LACTIC ACID: CPT | Performed by: PHYSICIAN ASSISTANT

## 2020-08-14 PROCEDURE — 25010000002 CEFTRIAXONE PER 250 MG: Performed by: PHYSICIAN ASSISTANT

## 2020-08-14 PROCEDURE — 84100 ASSAY OF PHOSPHORUS: CPT | Performed by: INTERNAL MEDICINE

## 2020-08-14 PROCEDURE — 80053 COMPREHEN METABOLIC PANEL: CPT | Performed by: INTERNAL MEDICINE

## 2020-08-14 PROCEDURE — 97162 PT EVAL MOD COMPLEX 30 MIN: CPT

## 2020-08-14 PROCEDURE — 83970 ASSAY OF PARATHORMONE: CPT | Performed by: PHYSICIAN ASSISTANT

## 2020-08-14 PROCEDURE — 84132 ASSAY OF SERUM POTASSIUM: CPT | Performed by: INTERNAL MEDICINE

## 2020-08-14 PROCEDURE — 25010000002 HEPARIN (PORCINE) PER 1000 UNITS: Performed by: INTERNAL MEDICINE

## 2020-08-14 RX ORDER — L.ACID,PARA/B.BIFIDUM/S.THERM 8B CELL
1 CAPSULE ORAL DAILY
Status: DISCONTINUED | OUTPATIENT
Start: 2020-08-14 | End: 2020-08-17 | Stop reason: HOSPADM

## 2020-08-14 RX ORDER — METOPROLOL TARTRATE 100 MG/1
100 TABLET ORAL 2 TIMES DAILY
Status: DISCONTINUED | OUTPATIENT
Start: 2020-08-14 | End: 2020-08-14

## 2020-08-14 RX ORDER — MAGNESIUM SULFATE HEPTAHYDRATE 40 MG/ML
4 INJECTION, SOLUTION INTRAVENOUS AS NEEDED
Status: DISCONTINUED | OUTPATIENT
Start: 2020-08-14 | End: 2020-08-17 | Stop reason: HOSPADM

## 2020-08-14 RX ORDER — ASPIRIN 81 MG/1
81 TABLET ORAL DAILY
Status: DISCONTINUED | OUTPATIENT
Start: 2020-08-14 | End: 2020-08-17 | Stop reason: HOSPADM

## 2020-08-14 RX ORDER — POTASSIUM CHLORIDE 20 MEQ/1
40 TABLET, EXTENDED RELEASE ORAL EVERY 4 HOURS
Status: COMPLETED | OUTPATIENT
Start: 2020-08-14 | End: 2020-08-14

## 2020-08-14 RX ORDER — MAGNESIUM SULFATE 1 G/100ML
1 INJECTION INTRAVENOUS AS NEEDED
Status: DISCONTINUED | OUTPATIENT
Start: 2020-08-14 | End: 2020-08-17 | Stop reason: HOSPADM

## 2020-08-14 RX ORDER — PANTOPRAZOLE SODIUM 40 MG/1
40 TABLET, DELAYED RELEASE ORAL
Status: DISCONTINUED | OUTPATIENT
Start: 2020-08-14 | End: 2020-08-17 | Stop reason: HOSPADM

## 2020-08-14 RX ORDER — DABIGATRAN ETEXILATE 75 MG/1
75 CAPSULE ORAL 2 TIMES DAILY
Status: DISCONTINUED | OUTPATIENT
Start: 2020-08-14 | End: 2020-08-17 | Stop reason: HOSPADM

## 2020-08-14 RX ORDER — SODIUM CHLORIDE 9 MG/ML
100 INJECTION, SOLUTION INTRAVENOUS CONTINUOUS
Status: DISCONTINUED | OUTPATIENT
Start: 2020-08-14 | End: 2020-08-14

## 2020-08-14 RX ORDER — ISOSORBIDE MONONITRATE 30 MG/1
30 TABLET, EXTENDED RELEASE ORAL 2 TIMES DAILY
Status: DISCONTINUED | OUTPATIENT
Start: 2020-08-14 | End: 2020-08-17 | Stop reason: HOSPADM

## 2020-08-14 RX ORDER — PRAVASTATIN SODIUM 40 MG
20 TABLET ORAL NIGHTLY
Status: DISCONTINUED | OUTPATIENT
Start: 2020-08-14 | End: 2020-08-17 | Stop reason: HOSPADM

## 2020-08-14 RX ORDER — MAGNESIUM SULFATE HEPTAHYDRATE 40 MG/ML
2 INJECTION, SOLUTION INTRAVENOUS AS NEEDED
Status: DISCONTINUED | OUTPATIENT
Start: 2020-08-14 | End: 2020-08-17 | Stop reason: HOSPADM

## 2020-08-14 RX ORDER — UREA 10 %
1 LOTION (ML) TOPICAL DAILY
Status: DISCONTINUED | OUTPATIENT
Start: 2020-08-14 | End: 2020-08-17 | Stop reason: HOSPADM

## 2020-08-14 RX ADMIN — PANTOPRAZOLE SODIUM 40 MG: 40 TABLET, DELAYED RELEASE ORAL at 05:35

## 2020-08-14 RX ADMIN — Medication 1 TABLET: at 10:16

## 2020-08-14 RX ADMIN — DABIGATRAN ETEXILATE MESYLATE 75 MG: 75 CAPSULE ORAL at 19:46

## 2020-08-14 RX ADMIN — Medication 1 CAPSULE: at 10:16

## 2020-08-14 RX ADMIN — CEFTRIAXONE 1 G: 1 INJECTION, POWDER, FOR SOLUTION INTRAMUSCULAR; INTRAVENOUS at 10:17

## 2020-08-14 RX ADMIN — ISOSORBIDE MONONITRATE 30 MG: 30 TABLET, EXTENDED RELEASE ORAL at 19:46

## 2020-08-14 RX ADMIN — POTASSIUM & SODIUM PHOSPHATES POWDER PACK 280-160-250 MG 2 PACKET: 280-160-250 PACK at 13:14

## 2020-08-14 RX ADMIN — POTASSIUM CHLORIDE 40 MEQ: 1500 TABLET, EXTENDED RELEASE ORAL at 11:02

## 2020-08-14 RX ADMIN — ISOSORBIDE MONONITRATE 30 MG: 30 TABLET, EXTENDED RELEASE ORAL at 10:16

## 2020-08-14 RX ADMIN — PRAVASTATIN SODIUM 20 MG: 40 TABLET ORAL at 05:35

## 2020-08-14 RX ADMIN — SODIUM CHLORIDE 100 ML/HR: 9 INJECTION, SOLUTION INTRAVENOUS at 02:02

## 2020-08-14 RX ADMIN — DABIGATRAN ETEXILATE MESYLATE 75 MG: 75 CAPSULE ORAL at 10:17

## 2020-08-14 RX ADMIN — METOPROLOL TARTRATE 150 MG: 100 TABLET, FILM COATED ORAL at 19:47

## 2020-08-14 RX ADMIN — ASPIRIN 81 MG: 81 TABLET, COATED ORAL at 10:16

## 2020-08-14 RX ADMIN — POTASSIUM CHLORIDE 40 MEQ: 1500 TABLET, EXTENDED RELEASE ORAL at 05:35

## 2020-08-14 RX ADMIN — HEPARIN SODIUM 5000 UNITS: 5000 INJECTION INTRAVENOUS; SUBCUTANEOUS at 00:22

## 2020-08-14 RX ADMIN — SODIUM CHLORIDE 1000 ML: 9 INJECTION, SOLUTION INTRAVENOUS at 17:18

## 2020-08-14 RX ADMIN — SODIUM CHLORIDE 500 ML: 9 INJECTION, SOLUTION INTRAVENOUS at 22:20

## 2020-08-14 RX ADMIN — PRAVASTATIN SODIUM 20 MG: 40 TABLET ORAL at 19:47

## 2020-08-14 RX ADMIN — PANTOPRAZOLE SODIUM 40 MG: 40 TABLET, DELAYED RELEASE ORAL at 05:36

## 2020-08-14 RX ADMIN — SODIUM PHOSPHATE, MONOBASIC, MONOHYDRATE 30 MMOL: 276; 142 INJECTION, SOLUTION INTRAVENOUS at 23:11

## 2020-08-14 RX ADMIN — SODIUM CHLORIDE 1000 ML: 9 INJECTION, SOLUTION INTRAVENOUS at 19:01

## 2020-08-15 ENCOUNTER — APPOINTMENT (OUTPATIENT)
Dept: CARDIOLOGY | Facility: HOSPITAL | Age: 85
End: 2020-08-15

## 2020-08-15 LAB
ANION GAP SERPL CALCULATED.3IONS-SCNC: 9 MMOL/L (ref 5–15)
B PERT DNA SPEC QL NAA+PROBE: NOT DETECTED
BH CV ECHO MEAS - % IVS THICK: -4.6 %
BH CV ECHO MEAS - % LVPW THICK: -13.1 %
BH CV ECHO MEAS - ACS: 2.2 CM
BH CV ECHO MEAS - AO ROOT AREA (BSA CORRECTED): 1.7
BH CV ECHO MEAS - AO ROOT AREA: 7.1 CM^2
BH CV ECHO MEAS - AO ROOT DIAM: 3 CM
BH CV ECHO MEAS - BSA(HAYCOCK): 1.8 M^2
BH CV ECHO MEAS - BSA: 1.7 M^2
BH CV ECHO MEAS - BZI_BMI: 31.6 KILOGRAMS/M^2
BH CV ECHO MEAS - BZI_METRIC_HEIGHT: 154.9 CM
BH CV ECHO MEAS - BZI_METRIC_WEIGHT: 75.8 KG
BH CV ECHO MEAS - EDV(CUBED): 72 ML
BH CV ECHO MEAS - EDV(MOD-SP4): 52.3 ML
BH CV ECHO MEAS - EDV(TEICH): 76.8 ML
BH CV ECHO MEAS - EF(CUBED): 72.6 %
BH CV ECHO MEAS - EF(MOD-SP4): 65 %
BH CV ECHO MEAS - EF(TEICH): 64.7 %
BH CV ECHO MEAS - ESV(CUBED): 19.8 ML
BH CV ECHO MEAS - ESV(MOD-SP4): 18.3 ML
BH CV ECHO MEAS - ESV(TEICH): 27.1 ML
BH CV ECHO MEAS - FS: 35 %
BH CV ECHO MEAS - IVS/LVPW: 0.79
BH CV ECHO MEAS - IVSD: 1 CM
BH CV ECHO MEAS - IVSS: 0.96 CM
BH CV ECHO MEAS - LA DIMENSION: 3.4 CM
BH CV ECHO MEAS - LA/AO: 1.1
BH CV ECHO MEAS - LV DIASTOLIC VOL/BSA (35-75): 29.9 ML/M^2
BH CV ECHO MEAS - LV MASS(C)D: 163.4 GRAMS
BH CV ECHO MEAS - LV MASS(C)DI: 93.4 GRAMS/M^2
BH CV ECHO MEAS - LV MASS(C)S: 74.7 GRAMS
BH CV ECHO MEAS - LV MASS(C)SI: 42.7 GRAMS/M^2
BH CV ECHO MEAS - LV SYSTOLIC VOL/BSA (12-30): 10.5 ML/M^2
BH CV ECHO MEAS - LVIDD: 4.2 CM
BH CV ECHO MEAS - LVIDS: 2.7 CM
BH CV ECHO MEAS - LVLD AP4: 5.3 CM
BH CV ECHO MEAS - LVLS AP4: 4.3 CM
BH CV ECHO MEAS - LVOT AREA (M): 3.1 CM^2
BH CV ECHO MEAS - LVOT AREA: 3.1 CM^2
BH CV ECHO MEAS - LVOT DIAM: 2 CM
BH CV ECHO MEAS - LVPWD: 1.3 CM
BH CV ECHO MEAS - LVPWS: 1.1 CM
BH CV ECHO MEAS - PA ACC TIME: 0.11 SEC
BH CV ECHO MEAS - PA PR(ACCEL): 31.3 MMHG
BH CV ECHO MEAS - RAP SYSTOLE: 10 MMHG
BH CV ECHO MEAS - RVSP: 26.8 MMHG
BH CV ECHO MEAS - SI(CUBED): 29.9 ML/M^2
BH CV ECHO MEAS - SI(MOD-SP4): 19.4 ML/M^2
BH CV ECHO MEAS - SI(TEICH): 28.4 ML/M^2
BH CV ECHO MEAS - SV(CUBED): 52.2 ML
BH CV ECHO MEAS - SV(MOD-SP4): 34 ML
BH CV ECHO MEAS - SV(TEICH): 49.7 ML
BH CV ECHO MEAS - TR MAX VEL: 205 CM/SEC
BUN SERPL-MCNC: 21 MG/DL (ref 8–23)
BUN/CREAT SERPL: 26.3 (ref 7–25)
C PNEUM DNA NPH QL NAA+NON-PROBE: NOT DETECTED
CALCIUM SPEC-SCNC: 9.7 MG/DL (ref 8.6–10.5)
CHLORIDE SERPL-SCNC: 109 MMOL/L (ref 98–107)
CO2 SERPL-SCNC: 25 MMOL/L (ref 22–29)
CREAT SERPL-MCNC: 0.8 MG/DL (ref 0.57–1)
DEPRECATED RDW RBC AUTO: 55.3 FL (ref 37–54)
ERYTHROCYTE [DISTWIDTH] IN BLOOD BY AUTOMATED COUNT: 14.7 % (ref 12.3–15.4)
FLUAV H1 2009 PAND RNA NPH QL NAA+PROBE: NOT DETECTED
FLUAV H1 HA GENE NPH QL NAA+PROBE: NOT DETECTED
FLUAV H3 RNA NPH QL NAA+PROBE: NOT DETECTED
FLUAV SUBTYP SPEC NAA+PROBE: NOT DETECTED
FLUBV RNA ISLT QL NAA+PROBE: NOT DETECTED
GFR SERPL CREATININE-BSD FRML MDRD: 68 ML/MIN/1.73
GLUCOSE SERPL-MCNC: 125 MG/DL (ref 65–99)
HADV DNA SPEC NAA+PROBE: NOT DETECTED
HAV IGM SERPL QL IA: NORMAL
HBV CORE IGM SERPL QL IA: NORMAL
HBV SURFACE AG SERPL QL IA: NORMAL
HCOV 229E RNA SPEC QL NAA+PROBE: NOT DETECTED
HCOV HKU1 RNA SPEC QL NAA+PROBE: NOT DETECTED
HCOV NL63 RNA SPEC QL NAA+PROBE: NOT DETECTED
HCOV OC43 RNA SPEC QL NAA+PROBE: NOT DETECTED
HCT VFR BLD AUTO: 41.8 % (ref 34–46.6)
HCV AB SER DONR QL: NORMAL
HGB BLD-MCNC: 13.6 G/DL (ref 12–15.9)
HMPV RNA NPH QL NAA+NON-PROBE: NOT DETECTED
HOLD SPECIMEN: NORMAL
HPIV1 RNA SPEC QL NAA+PROBE: NOT DETECTED
HPIV2 RNA SPEC QL NAA+PROBE: NOT DETECTED
HPIV3 RNA NPH QL NAA+PROBE: NOT DETECTED
HPIV4 P GENE NPH QL NAA+PROBE: NOT DETECTED
M PNEUMO IGG SER IA-ACNC: NOT DETECTED
MAGNESIUM SERPL-MCNC: 1.7 MG/DL (ref 1.6–2.4)
MAXIMAL PREDICTED HEART RATE: 136 BPM
MCH RBC QN AUTO: 33.6 PG (ref 26.6–33)
MCHC RBC AUTO-ENTMCNC: 32.5 G/DL (ref 31.5–35.7)
MCV RBC AUTO: 103.2 FL (ref 79–97)
PHOSPHATE SERPL-MCNC: 1.7 MG/DL (ref 2.5–4.5)
PHOSPHATE SERPL-MCNC: 1.9 MG/DL (ref 2.5–4.5)
PHOSPHATE SERPL-MCNC: 2 MG/DL (ref 2.5–4.5)
PLATELET # BLD AUTO: 196 10*3/MM3 (ref 140–450)
PMV BLD AUTO: 11.4 FL (ref 6–12)
POTASSIUM SERPL-SCNC: 3.6 MMOL/L (ref 3.5–5.2)
PROCALCITONIN SERPL-MCNC: 0.07 NG/ML (ref 0–0.25)
RBC # BLD AUTO: 4.05 10*6/MM3 (ref 3.77–5.28)
RHINOVIRUS RNA SPEC NAA+PROBE: NOT DETECTED
RSV RNA NPH QL NAA+NON-PROBE: NOT DETECTED
SODIUM SERPL-SCNC: 143 MMOL/L (ref 136–145)
STRESS TARGET HR: 116 BPM
WBC # BLD AUTO: 13.72 10*3/MM3 (ref 3.4–10.8)

## 2020-08-15 PROCEDURE — 83735 ASSAY OF MAGNESIUM: CPT | Performed by: INTERNAL MEDICINE

## 2020-08-15 PROCEDURE — 93306 TTE W/DOPPLER COMPLETE: CPT | Performed by: SPECIALIST

## 2020-08-15 PROCEDURE — 80048 BASIC METABOLIC PNL TOTAL CA: CPT | Performed by: INTERNAL MEDICINE

## 2020-08-15 PROCEDURE — 80074 ACUTE HEPATITIS PANEL: CPT | Performed by: PHYSICIAN ASSISTANT

## 2020-08-15 PROCEDURE — 0099U HC BIOFIRE FILMARRAY RESP PANEL 1: CPT | Performed by: INTERNAL MEDICINE

## 2020-08-15 PROCEDURE — 99232 SBSQ HOSP IP/OBS MODERATE 35: CPT | Performed by: INTERNAL MEDICINE

## 2020-08-15 PROCEDURE — 25010000002 CEFTRIAXONE PER 250 MG: Performed by: PHYSICIAN ASSISTANT

## 2020-08-15 PROCEDURE — 85027 COMPLETE CBC AUTOMATED: CPT | Performed by: INTERNAL MEDICINE

## 2020-08-15 PROCEDURE — 84100 ASSAY OF PHOSPHORUS: CPT | Performed by: INTERNAL MEDICINE

## 2020-08-15 PROCEDURE — 84145 PROCALCITONIN (PCT): CPT | Performed by: INTERNAL MEDICINE

## 2020-08-15 PROCEDURE — 93306 TTE W/DOPPLER COMPLETE: CPT

## 2020-08-15 PROCEDURE — 94799 UNLISTED PULMONARY SVC/PX: CPT

## 2020-08-15 RX ADMIN — DABIGATRAN ETEXILATE MESYLATE 75 MG: 75 CAPSULE ORAL at 09:47

## 2020-08-15 RX ADMIN — POTASSIUM PHOSPHATE, MONOBASIC AND POTASSIUM PHOSPHATE, DIBASIC 30 MMOL: 224; 236 INJECTION, SOLUTION, CONCENTRATE INTRAVENOUS at 23:17

## 2020-08-15 RX ADMIN — ISOSORBIDE MONONITRATE 30 MG: 30 TABLET, EXTENDED RELEASE ORAL at 09:47

## 2020-08-15 RX ADMIN — ISOSORBIDE MONONITRATE 30 MG: 30 TABLET, EXTENDED RELEASE ORAL at 20:00

## 2020-08-15 RX ADMIN — CEFTRIAXONE 1 G: 1 INJECTION, POWDER, FOR SOLUTION INTRAMUSCULAR; INTRAVENOUS at 11:38

## 2020-08-15 RX ADMIN — POTASSIUM & SODIUM PHOSPHATES POWDER PACK 280-160-250 MG 2 PACKET: 280-160-250 PACK at 20:01

## 2020-08-15 RX ADMIN — ASPIRIN 81 MG: 81 TABLET, COATED ORAL at 09:47

## 2020-08-15 RX ADMIN — Medication 1 TABLET: at 09:49

## 2020-08-15 RX ADMIN — DABIGATRAN ETEXILATE MESYLATE 75 MG: 75 CAPSULE ORAL at 20:00

## 2020-08-15 RX ADMIN — PRAVASTATIN SODIUM 20 MG: 40 TABLET ORAL at 20:00

## 2020-08-15 RX ADMIN — POTASSIUM & SODIUM PHOSPHATES POWDER PACK 280-160-250 MG 2 PACKET: 280-160-250 PACK at 09:47

## 2020-08-15 RX ADMIN — POTASSIUM & SODIUM PHOSPHATES POWDER PACK 280-160-250 MG 2 PACKET: 280-160-250 PACK at 11:36

## 2020-08-15 RX ADMIN — METOPROLOL TARTRATE 150 MG: 100 TABLET, FILM COATED ORAL at 20:00

## 2020-08-15 RX ADMIN — PANTOPRAZOLE SODIUM 40 MG: 40 TABLET, DELAYED RELEASE ORAL at 05:26

## 2020-08-15 RX ADMIN — METOPROLOL TARTRATE 150 MG: 100 TABLET, FILM COATED ORAL at 09:47

## 2020-08-15 RX ADMIN — Medication 1 CAPSULE: at 09:47

## 2020-08-15 RX ADMIN — POTASSIUM & SODIUM PHOSPHATES POWDER PACK 280-160-250 MG 2 PACKET: 280-160-250 PACK at 18:45

## 2020-08-16 LAB
ANION GAP SERPL CALCULATED.3IONS-SCNC: 13.4 MMOL/L (ref 5–15)
BACTERIA SPEC AEROBE CULT: ABNORMAL
BUN SERPL-MCNC: 9 MG/DL (ref 8–23)
BUN/CREAT SERPL: 14.5 (ref 7–25)
CALCIUM SPEC-SCNC: 9.2 MG/DL (ref 8.6–10.5)
CHLORIDE SERPL-SCNC: 105 MMOL/L (ref 98–107)
CO2 SERPL-SCNC: 20.6 MMOL/L (ref 22–29)
CREAT SERPL-MCNC: 0.62 MG/DL (ref 0.57–1)
DEPRECATED RDW RBC AUTO: 52.3 FL (ref 37–54)
ERYTHROCYTE [DISTWIDTH] IN BLOOD BY AUTOMATED COUNT: 14.4 % (ref 12.3–15.4)
GFR SERPL CREATININE-BSD FRML MDRD: 92 ML/MIN/1.73
GLUCOSE SERPL-MCNC: 101 MG/DL (ref 65–99)
HCT VFR BLD AUTO: 43.3 % (ref 34–46.6)
HGB BLD-MCNC: 14.6 G/DL (ref 12–15.9)
MCH RBC QN AUTO: 33.1 PG (ref 26.6–33)
MCHC RBC AUTO-ENTMCNC: 33.7 G/DL (ref 31.5–35.7)
MCV RBC AUTO: 98.2 FL (ref 79–97)
PHOSPHATE SERPL-MCNC: 2 MG/DL (ref 2.5–4.5)
PLATELET # BLD AUTO: 165 10*3/MM3 (ref 140–450)
PMV BLD AUTO: 11.1 FL (ref 6–12)
POTASSIUM SERPL-SCNC: 3.8 MMOL/L (ref 3.5–5.2)
RBC # BLD AUTO: 4.41 10*6/MM3 (ref 3.77–5.28)
SODIUM SERPL-SCNC: 139 MMOL/L (ref 136–145)
WBC # BLD AUTO: 12.66 10*3/MM3 (ref 3.4–10.8)

## 2020-08-16 PROCEDURE — 99232 SBSQ HOSP IP/OBS MODERATE 35: CPT | Performed by: INTERNAL MEDICINE

## 2020-08-16 PROCEDURE — 25010000002 CEFTRIAXONE PER 250 MG: Performed by: PHYSICIAN ASSISTANT

## 2020-08-16 PROCEDURE — 25010000002 DIPHENHYDRAMINE PER 50 MG: Performed by: PHYSICIAN ASSISTANT

## 2020-08-16 PROCEDURE — 80048 BASIC METABOLIC PNL TOTAL CA: CPT | Performed by: INTERNAL MEDICINE

## 2020-08-16 PROCEDURE — 84100 ASSAY OF PHOSPHORUS: CPT | Performed by: INTERNAL MEDICINE

## 2020-08-16 PROCEDURE — 94799 UNLISTED PULMONARY SVC/PX: CPT

## 2020-08-16 PROCEDURE — 85027 COMPLETE CBC AUTOMATED: CPT | Performed by: INTERNAL MEDICINE

## 2020-08-16 RX ORDER — DIPHENHYDRAMINE HYDROCHLORIDE 50 MG/ML
25 INJECTION INTRAMUSCULAR; INTRAVENOUS ONCE
Status: COMPLETED | OUTPATIENT
Start: 2020-08-16 | End: 2020-08-16

## 2020-08-16 RX ORDER — NYSTATIN 100000 U/G
CREAM TOPICAL EVERY 12 HOURS SCHEDULED
Status: DISCONTINUED | OUTPATIENT
Start: 2020-08-16 | End: 2020-08-17 | Stop reason: HOSPADM

## 2020-08-16 RX ADMIN — POTASSIUM PHOSPHATE, MONOBASIC AND POTASSIUM PHOSPHATE, DIBASIC 15 MMOL: 224; 236 INJECTION, SOLUTION, CONCENTRATE INTRAVENOUS at 20:51

## 2020-08-16 RX ADMIN — POTASSIUM & SODIUM PHOSPHATES POWDER PACK 280-160-250 MG 2 PACKET: 280-160-250 PACK at 15:32

## 2020-08-16 RX ADMIN — SODIUM CHLORIDE, PRESERVATIVE FREE 10 ML: 5 INJECTION INTRAVENOUS at 20:35

## 2020-08-16 RX ADMIN — Medication 1 TABLET: at 08:15

## 2020-08-16 RX ADMIN — DILTIAZEM HYDROCHLORIDE 30 MG: 30 TABLET, FILM COATED ORAL at 11:00

## 2020-08-16 RX ADMIN — METOPROLOL TARTRATE 150 MG: 100 TABLET, FILM COATED ORAL at 08:13

## 2020-08-16 RX ADMIN — Medication 1 CAPSULE: at 08:13

## 2020-08-16 RX ADMIN — DILTIAZEM HYDROCHLORIDE 30 MG: 30 TABLET, FILM COATED ORAL at 23:27

## 2020-08-16 RX ADMIN — DIPHENHYDRAMINE HYDROCHLORIDE 25 MG: 50 INJECTION INTRAMUSCULAR; INTRAVENOUS at 00:46

## 2020-08-16 RX ADMIN — DABIGATRAN ETEXILATE MESYLATE 75 MG: 75 CAPSULE ORAL at 08:13

## 2020-08-16 RX ADMIN — ASPIRIN 81 MG: 81 TABLET, COATED ORAL at 08:13

## 2020-08-16 RX ADMIN — CEFTRIAXONE 1 G: 1 INJECTION, POWDER, FOR SOLUTION INTRAMUSCULAR; INTRAVENOUS at 09:29

## 2020-08-16 RX ADMIN — METOPROLOL TARTRATE 150 MG: 100 TABLET, FILM COATED ORAL at 20:34

## 2020-08-16 RX ADMIN — SODIUM CHLORIDE, PRESERVATIVE FREE 10 ML: 5 INJECTION INTRAVENOUS at 08:13

## 2020-08-16 RX ADMIN — ISOSORBIDE MONONITRATE 30 MG: 30 TABLET, EXTENDED RELEASE ORAL at 08:13

## 2020-08-16 RX ADMIN — DILTIAZEM HYDROCHLORIDE 30 MG: 30 TABLET, FILM COATED ORAL at 16:49

## 2020-08-16 RX ADMIN — PRAVASTATIN SODIUM 20 MG: 40 TABLET ORAL at 20:35

## 2020-08-16 RX ADMIN — POTASSIUM & SODIUM PHOSPHATES POWDER PACK 280-160-250 MG 2 PACKET: 280-160-250 PACK at 11:00

## 2020-08-16 RX ADMIN — DABIGATRAN ETEXILATE MESYLATE 75 MG: 75 CAPSULE ORAL at 20:35

## 2020-08-16 RX ADMIN — POTASSIUM & SODIUM PHOSPHATES POWDER PACK 280-160-250 MG 2 PACKET: 280-160-250 PACK at 08:13

## 2020-08-16 RX ADMIN — ISOSORBIDE MONONITRATE 30 MG: 30 TABLET, EXTENDED RELEASE ORAL at 20:34

## 2020-08-16 RX ADMIN — NYSTATIN: 100000 CREAM TOPICAL at 20:34

## 2020-08-17 VITALS
SYSTOLIC BLOOD PRESSURE: 131 MMHG | WEIGHT: 165.7 LBS | BODY MASS INDEX: 31.28 KG/M2 | DIASTOLIC BLOOD PRESSURE: 71 MMHG | HEART RATE: 68 BPM | HEIGHT: 61 IN | RESPIRATION RATE: 18 BRPM | TEMPERATURE: 98.3 F | OXYGEN SATURATION: 98 %

## 2020-08-17 LAB — PHOSPHATE SERPL-MCNC: 1.9 MG/DL (ref 2.5–4.5)

## 2020-08-17 PROCEDURE — 94799 UNLISTED PULMONARY SVC/PX: CPT

## 2020-08-17 PROCEDURE — 99239 HOSP IP/OBS DSCHRG MGMT >30: CPT | Performed by: INTERNAL MEDICINE

## 2020-08-17 PROCEDURE — 25010000002 CEFTRIAXONE PER 250 MG: Performed by: PHYSICIAN ASSISTANT

## 2020-08-17 PROCEDURE — 84100 ASSAY OF PHOSPHORUS: CPT | Performed by: INTERNAL MEDICINE

## 2020-08-17 RX ORDER — L.ACID,PARA/B.BIFIDUM/S.THERM 8B CELL
1 CAPSULE ORAL DAILY
Qty: 4 CAPSULE | Refills: 0 | Status: SHIPPED | OUTPATIENT
Start: 2020-08-18

## 2020-08-17 RX ORDER — CEFDINIR 300 MG/1
300 CAPSULE ORAL EVERY 12 HOURS SCHEDULED
Status: DISCONTINUED | OUTPATIENT
Start: 2020-08-17 | End: 2020-08-17 | Stop reason: HOSPADM

## 2020-08-17 RX ORDER — DILTIAZEM HYDROCHLORIDE 120 MG/1
120 CAPSULE, COATED, EXTENDED RELEASE ORAL
Qty: 30 CAPSULE | Refills: 0 | Status: SHIPPED | OUTPATIENT
Start: 2020-08-17

## 2020-08-17 RX ORDER — CEFDINIR 300 MG/1
300 CAPSULE ORAL EVERY 12 HOURS SCHEDULED
Qty: 8 CAPSULE | Refills: 0 | Status: SHIPPED | OUTPATIENT
Start: 2020-08-17 | End: 2020-08-21

## 2020-08-17 RX ORDER — DILTIAZEM HYDROCHLORIDE 120 MG/1
120 CAPSULE, COATED, EXTENDED RELEASE ORAL
Status: DISCONTINUED | OUTPATIENT
Start: 2020-08-17 | End: 2020-08-17 | Stop reason: HOSPADM

## 2020-08-17 RX ADMIN — ISOSORBIDE MONONITRATE 30 MG: 30 TABLET, EXTENDED RELEASE ORAL at 08:46

## 2020-08-17 RX ADMIN — PANTOPRAZOLE SODIUM 40 MG: 40 TABLET, DELAYED RELEASE ORAL at 05:24

## 2020-08-17 RX ADMIN — ASPIRIN 81 MG: 81 TABLET, COATED ORAL at 08:46

## 2020-08-17 RX ADMIN — SODIUM CHLORIDE, PRESERVATIVE FREE 10 ML: 5 INJECTION INTRAVENOUS at 08:46

## 2020-08-17 RX ADMIN — Medication 1 TABLET: at 08:57

## 2020-08-17 RX ADMIN — NYSTATIN: 100000 CREAM TOPICAL at 08:45

## 2020-08-17 RX ADMIN — POTASSIUM PHOSPHATE, MONOBASIC AND POTASSIUM PHOSPHATE, DIBASIC 15 MMOL: 224; 236 INJECTION, SOLUTION, CONCENTRATE INTRAVENOUS at 11:00

## 2020-08-17 RX ADMIN — METOPROLOL TARTRATE 150 MG: 100 TABLET, FILM COATED ORAL at 08:46

## 2020-08-17 RX ADMIN — DABIGATRAN ETEXILATE MESYLATE 75 MG: 75 CAPSULE ORAL at 08:45

## 2020-08-17 RX ADMIN — DILTIAZEM HYDROCHLORIDE 30 MG: 30 TABLET, FILM COATED ORAL at 05:24

## 2020-08-17 RX ADMIN — CEFTRIAXONE 1 G: 1 INJECTION, POWDER, FOR SOLUTION INTRAMUSCULAR; INTRAVENOUS at 10:04

## 2020-08-17 RX ADMIN — Medication 1 CAPSULE: at 08:46

## 2020-08-17 RX ADMIN — DILTIAZEM HYDROCHLORIDE 30 MG: 30 TABLET, FILM COATED ORAL at 11:00

## 2020-08-18 ENCOUNTER — READMISSION MANAGEMENT (OUTPATIENT)
Dept: CALL CENTER | Facility: HOSPITAL | Age: 85
End: 2020-08-18

## 2020-08-18 NOTE — OUTREACH NOTE
Prep Survey      Responses   [facility] facility patient discharged from?  [facility]   Is LACE score < 7 ?  No   Eligibility  Readm Mgmt   Discharge diagnosis  sepsis  Delirium/metabolic encephalopathy secondary to sepsis superimposed on dementia   COVID-19 Test Status  Not tested   Does the patient have one of the following disease processes/diagnoses(primary or secondary)?  Sepsis   Does the patient have Home health ordered?  Yes   What is the Home health agency?   [facility]   Is there a DME ordered?  Yes   What DME was ordered?  rolator   Comments regarding appointments  see AVS   Medication alerts for this patient  see AVS   Prep survey completed?  Yes          Jolynn Cooley RN

## 2020-08-19 LAB — BACTERIA SPEC AEROBE CULT: NORMAL

## 2020-08-21 ENCOUNTER — READMISSION MANAGEMENT (OUTPATIENT)
Dept: CALL CENTER | Facility: HOSPITAL | Age: 85
End: 2020-08-21

## 2020-08-21 NOTE — OUTREACH NOTE
Sepsis Week 1 Survey      Responses   [facility] patient discharged from?  [place]   COVID-19 Test Status  Not tested   Does the patient have one of the following disease processes/diagnoses(primary or secondary)?  Sepsis   Is there a successful TCM telephone encounter documented?  No   Week 1 attempt successful?  Yes   Call start time  1438   Call end time  1444   Discharge diagnosis  sepsis  Delirium/metabolic encephalopathy secondary to sepsis superimposed on dementia   Is patient permission given to speak with other caregiver?  Yes   Person spoke with today (if not patient) and relationship  Dtr   Meds reviewed with patient/caregiver?  Yes   Is the patient having any side effects they believe may be caused by any medication additions or changes?  No   Does the patient have all medications related to this admission filled (includes all antibiotics, inhalers, nebulizers,steroids,etc.)  Yes   Is the patient taking all medications as directed (includes completed medication regime)?  Yes   Does the patient have a primary care provider?   Yes   Does the patient have an appointment with their PCP within 7 days of discharge?  Yes   Has the patient kept scheduled appointments due by today?  N/A   What is the Home health agency?   [facility]   Has home health visited the patient within 72 hours of discharge?  Yes   What DME was ordered?  rolator   Has all DME been delivered?  Yes   Pulse Ox monitoring  None   Psychosocial issues?  No   Did the patient receive a copy of their discharge instructions?  Yes   Nursing interventions  Reviewed instructions with patient   What is the patient's perception of their health status since discharge?  Improving   Nursing interventions  Nurse provided patient education   Is the patient/caregiver able to teach back Sepsis?  S - Shivering,fever or very cold, E - Extreme pain or generalized discomfort (worst ever,especially abdomen), P - Pale or discolored skin, S - Short of  breath, S - Sleepy, difficult to arouse,confused, I -   I feel like I might die-a feeling of hopelessness   Nursing interventions  Nurse provided reassurance to patient   Is patient/caregiver able to teach back steps to recovery at home?  Set small, achievable goals for return to baseline health, Rest and regain strength, Talk about feelings with family/friends, Make a list of questions for PCP appoinment, Exercise as tolerated, Eat a balanced diet   Is the patient/caregiver able to teach back signs and symptoms of worsening condition:  Fever, Edema, Shortness of breath/rapid respiratory rate   Is the patient/caregiver able to teach back the hierarchy of who to call/visit for symptoms/problems? PCP, Specialist, Home health nurse, Urgent Care, ED, 911  Yes   Additional teach back comments  Swelling in her ankles, encouraged daily weights.  No fever or issues.   Week 1 call completed?  Yes   Wrap up additional comments  Improving          Jackeline Alvarez RN

## 2020-08-27 ENCOUNTER — READMISSION MANAGEMENT (OUTPATIENT)
Dept: CALL CENTER | Facility: HOSPITAL | Age: 85
End: 2020-08-27

## 2020-08-27 NOTE — OUTREACH NOTE
Sepsis Week 2 Survey      Responses   [facility] [place] patient discharged from?  [place]   COVID-19 Test Status  Not tested   Does the patient have one of the following disease processes/diagnoses(primary or secondary)?  Sepsis   Week 2 attempt successful?  Yes   Call start time  1602   Revoke  Readmitted   Discharge diagnosis  sepsis  Delirium/metabolic encephalopathy secondary to sepsis superimposed on dementia          Emma Taveras RN

## 2020-10-13 ENCOUNTER — LAB REQUISITION (OUTPATIENT)
Dept: LAB | Facility: HOSPITAL | Age: 85
End: 2020-10-13

## 2020-10-13 DIAGNOSIS — I50.42 CHRONIC COMBINED SYSTOLIC (CONGESTIVE) AND DIASTOLIC (CONGESTIVE) HEART FAILURE (HCC): ICD-10-CM

## 2020-10-13 LAB
ANION GAP SERPL CALCULATED.3IONS-SCNC: 10.8 MMOL/L (ref 5–15)
BUN SERPL-MCNC: 13 MG/DL (ref 8–23)
BUN/CREAT SERPL: 17.6 (ref 7–25)
CALCIUM SPEC-SCNC: 11.4 MG/DL (ref 8.6–10.5)
CHLORIDE SERPL-SCNC: 103 MMOL/L (ref 98–107)
CO2 SERPL-SCNC: 29.2 MMOL/L (ref 22–29)
CREAT SERPL-MCNC: 0.74 MG/DL (ref 0.57–1)
GFR SERPL CREATININE-BSD FRML MDRD: 75 ML/MIN/1.73
GLUCOSE SERPL-MCNC: 108 MG/DL (ref 65–99)
POTASSIUM SERPL-SCNC: 3.6 MMOL/L (ref 3.5–5.2)
SODIUM SERPL-SCNC: 143 MMOL/L (ref 136–145)

## 2020-10-13 PROCEDURE — 80048 BASIC METABOLIC PNL TOTAL CA: CPT | Performed by: INTERNAL MEDICINE

## 2025-03-05 NOTE — PROGRESS NOTES
Discharge Planning Assessment   [facility]     Patient Name: Matthew Fishman  MRN: [id number]  Today's Date: 1/24/2018    Admit Date: 1/24/2018          Discharge Needs Assessment       01/24/18 1551    Living Environment    Lives With child(kyle), adult    Living Arrangements apartment    Home Accessibility no concerns;bed and bath on same level    Stair Railings at Home none    Type of Financial/Environmental Concern none    Transportation Available family or friend will provide    Living Environment    Provides Primary Care For no one    Primary Care Provided By child(kyle) (specify)    Quality Of Family Relationships supportive;helpful;involved    Able to Return to Prior Living Arrangements yes    Discharge Needs Assessment    Concerns To Be Addressed no discharge needs identified;denies needs/concerns at this time    Readmission Within The Last 30 Days no previous admission in last 30 days    Equipment Currently Used at Home cane, quad;walker, rolling;shower chair    Discharge Disposition still a patient            Discharge Plan       01/24/18 1558    Case Management/Social Work Plan    Plan Discharge Planning    Patient/Family In Agreement With Plan yes    Additional Comments SW met with pt while in ICU for dcp. Pt's two daughters Helene and Kiana was also present and provided all info as pt was drowsy. Pt lives in an apt with her other daughter that was not present. She has a rolling walker, quad cane, and shower chair. She is independent with ADL's. She does have a PCP that she follows. She does not have an advance directive and info was offered. Daughter's deny any discharge needs at this time. Advised that CM will continue to follow and assist.        Discharge Placement     No information found        Expected Discharge Date and Time     Expected Discharge Date Expected Discharge Time    Jan 28, 2018               Demographic Summary     None            Functional Status       01/24/18 1551    Functional  Virtual Brief Visit  Name: Krystin Francis      : 1964      MRN: [id number]  Encounter Provider: FRANKLIN Maldonado  Encounter Date: 3/5/2025   Encounter department: [facility]  :  Assessment & Plan  Gastroesophageal reflux disease without esophagitis  -May trial pepcid PRN  -Recommends eating small healthy meals, do not eat spicy, fatty or fried food. Decrease caffeine or carbonated drinks. Do not eat 2 to 3 hours before bedtime. Maintain healthy weight and importance of physical exercises.    -Will reassess symptoms next OFV  Orders:    famotidine (PEPCID) 20 mg tablet; Take 1 tablet (20 mg total) by mouth 2 (two) times a day        History of Present Illness   Krystin Francis is a 60 y.o. with  has a past medical history of Anxiety, Cannabis use disorder, Cervical cancer ([facility]), Chronic pain, COPD (chronic obstructive pulmonary disease) ([facility]), Depression, Hyperlipidemia, Migraine, Migraine headache, Psychiatric disorder, and Severe episode of recurrent major depressive disorder, without psychotic features ([facility]).       Patient reports having acid reflux starting  which was not relieved with tums. Patient verbalizes concerns if its related to Wegovy. States last dose was given Saturday Morning. States this is her 6th dose. Patient denies any other concerns        Administrative Statements   Encounter provider FRANKLIN Maldonado    The Patient is located at Home and in the following state in which I hold an active license [place].    The patient was identified by name and date of birth. Krystin Francis was informed that this is a telemedicine visit and that the visit is being conducted through the [employer] Teams platform. She agrees to proceed..  My office door was closed. No one else was in the room.  She acknowledged consent and understanding of privacy and security of the video platform. The patient has agreed to participate and understands they can discontinue the  Status Prior    Ambulation 1-->assistive equipment    Transferring 0-->independent    Toileting 0-->independent    Bathing 1-->assistive equipment    Dressing 0-->independent    Eating 0-->independent    Communication 0-->understands/communicates without difficulty    Swallowing 0-->swallows foods/liquids without difficulty    IADL    Medications independent    Meal Preparation independent    Housekeeping independent    Laundry independent    Shopping independent    Oral Care independent    Activity Tolerance    Usual Activity Tolerance moderate    Current Activity Tolerance fair    Employment/Financial    Financial Concerns none            Psychosocial     None            Abuse/Neglect     None            Legal     None            Substance Abuse     None            Patient Forms     None          KYLEIGH Orozco, PATRICIOW  01/24/18  3:57 PM       visit at any time.    I have spent a total time of 10 minutes in caring for this patient on the day of the visit/encounter including Risks and benefits of tx options and Importance of tx compliance, not including the time spent for establishing the audio/video connection.

## (undated) DEVICE — CATH URETRL RUTNER UNIV WEDGE 5F70CM

## (undated) DEVICE — RICH CYSTO: Brand: MEDLINE INDUSTRIES, INC.

## (undated) DEVICE — BALLOON DILATATION CATHETER KIT: Brand: UROMAX ULTRA KIT

## (undated) DEVICE — Device

## (undated) DEVICE — CUFF SCD HEMOFORCE SEQ CALF STD MD

## (undated) DEVICE — TBG FLUID WARM ST SNGL

## (undated) DEVICE — NITINOL STONE RETRIEVAL BASKET: Brand: ESCAPE

## (undated) DEVICE — SOL IRR NACL 0.9PCT 3000ML

## (undated) DEVICE — NITINOL WIRE WITH HYDROPHILIC TIP: Brand: SENSOR